# Patient Record
Sex: FEMALE | Race: WHITE
[De-identification: names, ages, dates, MRNs, and addresses within clinical notes are randomized per-mention and may not be internally consistent; named-entity substitution may affect disease eponyms.]

---

## 2018-04-20 NOTE — EDM.PDOC
ED HPI GENERAL MEDICAL PROBLEM





- General


Chief Complaint: Abdominal Pain


Stated Complaint: PAINS IN STOMACH


Time Seen by Provider: 04/20/18 07:00


Source of Information: Reports: Patient


History Limitations: Reports: No Limitations





- History of Present Illness


INITIAL COMMENTS - FREE TEXT/NARRATIVE: 





This 58 yo female patient reports to the ED with abdominal pain. The patient 

reports her symptoms started at about 0230 this morning with nausea, vomiting 

and diarrhea. The patient reports her pain has been getting worse since the 

onset. The patient reports she has been having some moderate abdominal pain 

over the past couple of weeks due to taking Metformin, but it has not been this 

bad. 


Onset: Today


Onset Date: 04/20/18


Onset Time: 02:30


Duration: Constant, Getting Worse


Location: Reports: Abdomen (upper abdomen (epigastric area))


Quality: Reports: Sharp, Stabbing


Severity: Severe


Improves with: Reports: None


Worsens with: Reports: None


Context: Reports: Other


Associated Symptoms: Reports: Nausea/Vomiting, Other (diarrhea)


  ** Middle Abdomen


Pain Score (Numeric/FACES): 8





- Related Data


 Allergies











Allergy/AdvReac Type Severity Reaction Status Date / Time


 


celecoxib [From Celebrex] Allergy  Blisters Verified 04/20/18 06:58


 


peanut Allergy  Blisters Verified 04/20/18 06:58











Home Meds: 


 Home Meds





diphenhydrAMINE HCl/Zinc Acet [Benadryl Itch Stopping Crm] 1 applic TOP Q6HR 

PRN 04/27/16 [History]


diphenhydrAMINE [Benadryl] 1 tab PO Q4HR PRN 04/27/16 [History]


traMADol [Ultram] 1 tab PO Q6HR PRN 04/27/16 [History]


metFORMIN HCl [Metformin HCl] 1,000 mg PO BID 04/28/16 [History]


DULoxetine [Cymbalta] 20 mg PO DAILY 04/20/18 [History]











Past Medical History


HEENT History: Reports: None


Cardiovascular History: Reports: None


Respiratory History: Reports: None


Gastrointestinal History: Reports: None


Genitourinary History: Reports: None


OB/GYN History: Reports: None


Musculoskeletal History: Reports: RA


Neurological History: Reports: None


Psychiatric History: Reports: None


Endocrine/Metabolic History: Reports: Diabetes, Type II, Other (See Below)


Other Endocrine/Metabolic History: borderline diabetes


Hematologic History: Reports: None


Immunologic History: Reports: None, Other (See Below)


Other Immunologic History: RA


Oncologic (Cancer) History: Reports: None


Dermatologic History: Reports: None





- Infectious Disease History


Infectious Disease History: Reports: Chicken Pox





- Past Surgical History


Head Surgeries/Procedures: Reports: None


Female  Surgical History: Reports: Hysterectomy


Musculoskeletal Surgical History: Reports: Knee Replacement





Social & Family History





- Family History


Family Medical History: Noncontributory





- Tobacco Use


Smoking Status *Q: Never Smoker


Second Hand Smoke Exposure: No





- Caffeine Use


Caffeine Use: Reports: Coffee





- Recreational Drug Use


Recreational Drug Use: No





ED ROS GENERAL





- Review of Systems


Review Of Systems: ROS reveals no pertinent complaints other than HPI.





ED EXAM, GI/ABD





- Physical Exam


Exam: See Below


Exam Limited By: No Limitations


General Appearance: Alert, WD/WN, Moderate Distress


Eyes: Bilateral: Normal Appearance, EOMI


Ears: Normal External Exam, Normal Canal, Hearing Grossly Normal, Normal TMs


Nose: Normal Inspection, Normal Mucosa, No Blood


Throat/Mouth: Normal Inspection, Normal Lips, Normal Teeth, Normal Gums, Normal 

Oropharynx, Normal Voice, No Airway Compromise


Head: Atraumatic, Normocephalic


Neck: Normal Inspection, Supple, Non-Tender, Full Range of Motion


Respiratory/Chest: No Respiratory Distress, Lungs Clear, Normal Breath Sounds, 

No Accessory Muscle Use, Chest Non-Tender


Cardiovascular: Normal Peripheral Pulses, Regular Rate, Rhythm, No Edema, No 

Gallop, No JVD, No Murmur, No Rub


GI/Abdominal Exam: Normal Bowel Sounds, No Organomegaly, No Distention, No 

Abnormal Bruit, No Mass, Tender (across entire upper abdomen)


 (Female) Exam: Deferred


Rectal (Female) Exam: Deferred


Back Exam: Normal Inspection, Full Range of Motion, NT


Extremities: Normal Inspection, Normal Range of Motion, Non-Tender, Normal 

Capillary Refill, No Pedal Edema


Neurological: Alert, Oriented, CN II-XII Intact, Normal Cognition, Normal Gait, 

Normal Reflexes, No Motor/Sensory Deficits


Psychiatric: Normal Affect, Normal Mood


Skin Exam: Warm, Dry, Intact, Normal Color, No Rash


Lymphatic: No Adenopathy





Course





- Vital Signs


Last Recorded V/S: 


 Last Vital Signs











Temp  36.8 C   04/20/18 10:17


 


Pulse  88   04/20/18 10:17


 


Resp  12   04/20/18 10:17


 


BP  130/78   04/20/18 10:17


 


Pulse Ox  93 L  04/20/18 10:17














- Orders/Labs/Meds


Orders: 


 Active Orders 24 hr











 Category Date Time Status


 


 EKG Documentation Completion [RC] URGENT Care  04/20/18 09:11 Active


 


 Chest 1V Frontal [CR] Urgent Exams  04/20/18 09:12 Taken


 


 CULTURE BLOOD [BC] Stat Lab  04/20/18 07:22 Received


 


 CULTURE BLOOD [BC] Stat Lab  04/20/18 07:52 Received


 


 INFLUENZA A+B AG SCREEN [RM] Stat Lab  04/20/18 07:20 Ordered


 


 UA W/MICROSCOPIC [URIN] Stat Lab  04/20/18 08:20 Ordered











Labs: 


 Laboratory Tests











  04/20/18 04/20/18 04/20/18 Range/Units





  07:22 07:22 07:22 


 


WBC  24.7 H    (5.0-10.0)  10^3/uL


 


RBC  4.81    (4.2-5.4)  10^6/uL


 


Hgb  13.7    (12.0-16.0)  g/dL


 


Hct  40.9    (37.0-47.0)  %


 


MCV  85.0    ()  fL


 


MCH  28.5    (27.0-34.0)  pg


 


MCHC  33.5    (33.0-35.0)  g/dL


 


Plt Count  325    (150-450)  10^3/uL


 


Neut % (Auto)  89.7 H    (42.2-75.2)  %


 


Lymph % (Auto)  3.5 L    (20.5-50.1)  %


 


Mono % (Auto)  6.1    (2-8)  %


 


Eos % (Auto)  0.6 L    (1.0-3.0)  %


 


Baso % (Auto)  0.1    (0.0-1.0)  %


 


Sodium   134 L   (135-145)  mmol/L


 


Potassium   4.1   (3.6-5.0)  mmol/L


 


Chloride   100 L   (101-111)  mmol/L


 


Carbon Dioxide   25.0   (21.0-31.0)  mmol/L


 


Anion Gap   13.1   


 


BUN   23 H   (7-18)  mg/dL


 


Creatinine   0.7   (0.6-1.3)  mg/dL


 


Est Cr Clr Drug Dosing   78.83   mL/min


 


Estimated GFR (MDRD)   > 60   


 


BUN/Creatinine Ratio   32.85   


 


Glucose   227 H   ()  mg/dL


 


Lactic Acid    2.3 H  (0.5-2.2)  mmol/L


 


Calcium   8.9   (8.4-10.2)  mg/dl


 


Total Bilirubin   1.2 H   (0.2-1.0)  mg/dL


 


AST   24   (10-42)  IU/L


 


ALT   21   (10-60)  IU/L


 


Alkaline Phosphatase   62   ()  IU/L


 


Troponin I     (0.00-0.02)  ng/ml


 


Total Protein   7.2   (6.7-8.2)  g/dl


 


Albumin   3.8   (3.2-5.5)  g/dl


 


Globulin   3.4   


 


Albumin/Globulin Ratio   1.12   


 


Amylase   50   ()  U/L


 


Lipase   11 L   (22-51)  U/L


 


Urine Color     (YELLOW)  


 


Urine Appearance     (CLEAR)  


 


Urine pH     (5.0-9.0)  


 


Ur Specific Gravity     (1.005-1.030)  


 


Urine Protein     (NEGATIVE)  


 


Urine Glucose (UA)     (NEGATIVE)  


 


Urine Ketones     (NEGATIVE)  


 


Urine Occult Blood     (NEGATIVE)  


 


Urine Nitrite     (NEGATIVE)  


 


Urine Bilirubin     (NEGATIVE)  


 


Urine Urobilinogen     (0.2-1.0)  mg/dL


 


Ur Leukocyte Esterase     (NEGATIVE)  


 


Urine RBC     /HPF


 


Urine WBC     (0-5/HPF)  /HPF


 


Ur Epithelial Cells     /HPF


 


Urine Bacteria     (0-FEW/HPF)  /HPF


 


Urine Mucus     /LPF














  04/20/18 04/20/18 Range/Units





  07:22 08:20 


 


WBC    (5.0-10.0)  10^3/uL


 


RBC    (4.2-5.4)  10^6/uL


 


Hgb    (12.0-16.0)  g/dL


 


Hct    (37.0-47.0)  %


 


MCV    ()  fL


 


MCH    (27.0-34.0)  pg


 


MCHC    (33.0-35.0)  g/dL


 


Plt Count    (150-450)  10^3/uL


 


Neut % (Auto)    (42.2-75.2)  %


 


Lymph % (Auto)    (20.5-50.1)  %


 


Mono % (Auto)    (2-8)  %


 


Eos % (Auto)    (1.0-3.0)  %


 


Baso % (Auto)    (0.0-1.0)  %


 


Sodium    (135-145)  mmol/L


 


Potassium    (3.6-5.0)  mmol/L


 


Chloride    (101-111)  mmol/L


 


Carbon Dioxide    (21.0-31.0)  mmol/L


 


Anion Gap    


 


BUN    (7-18)  mg/dL


 


Creatinine    (0.6-1.3)  mg/dL


 


Est Cr Clr Drug Dosing    mL/min


 


Estimated GFR (MDRD)    


 


BUN/Creatinine Ratio    


 


Glucose    ()  mg/dL


 


Lactic Acid    (0.5-2.2)  mmol/L


 


Calcium    (8.4-10.2)  mg/dl


 


Total Bilirubin    (0.2-1.0)  mg/dL


 


AST    (10-42)  IU/L


 


ALT    (10-60)  IU/L


 


Alkaline Phosphatase    ()  IU/L


 


Troponin I  < 0.02   (0.00-0.02)  ng/ml


 


Total Protein    (6.7-8.2)  g/dl


 


Albumin    (3.2-5.5)  g/dl


 


Globulin    


 


Albumin/Globulin Ratio    


 


Amylase    ()  U/L


 


Lipase    (22-51)  U/L


 


Urine Color   Yellow  (YELLOW)  


 


Urine Appearance   Slightly cloudy  (CLEAR)  


 


Urine pH   5.0  (5.0-9.0)  


 


Ur Specific Gravity   1.020  (1.005-1.030)  


 


Urine Protein   Trace H  (NEGATIVE)  


 


Urine Glucose (UA)   500 H  (NEGATIVE)  


 


Urine Ketones   40 H  (NEGATIVE)  


 


Urine Occult Blood   Negative  (NEGATIVE)  


 


Urine Nitrite   Negative  (NEGATIVE)  


 


Urine Bilirubin   Negative  (NEGATIVE)  


 


Urine Urobilinogen   0.2  (0.2-1.0)  mg/dL


 


Ur Leukocyte Esterase   Negative  (NEGATIVE)  


 


Urine RBC   0-5  /HPF


 


Urine WBC   0-5  (0-5/HPF)  /HPF


 


Ur Epithelial Cells   Rare  /HPF


 


Urine Bacteria   Rare  (0-FEW/HPF)  /HPF


 


Urine Mucus   Rare  /LPF











Meds: 


Medications














Discontinued Medications














Generic Name Dose Route Start Last Admin





  Trade Name Freq  PRN Reason Stop Dose Admin


 


Calcium Carbonate/Glycine  1,000 mg  04/20/18 10:40  





  Tums  PO  04/20/18 10:41  





  ONETIME ONE   





     





     





     





     


 


Hydromorphone HCl  0.5 mg  04/20/18 07:35  04/20/18 07:41





  Dilaudid  IVPUSH  04/20/18 07:36  0.5 mg





  ONETIME ONE   Administration





     





     





     





     


 


Hydromorphone HCl  0.5 mg  04/20/18 07:58  04/20/18 08:07





  Dilaudid  IVPUSH  04/20/18 07:59  0.5 mg





  ONETIME ONE   Administration





     





     





     





     


 


Hydromorphone HCl  0.5 mg  04/20/18 10:40  





  Dilaudid  IVPUSH  04/20/18 10:41  





  ONETIME ONE   





     





     





     





     


 


Sodium Chloride  1,000 mls @ 999 mls/hr  04/20/18 07:11  04/20/18 07:18





  Normal Saline  IV  04/20/18 08:11  999 mls/hr





  .BOLUS ONE   Administration





     





     





     





     


 


Iopamidol  75 ml  04/20/18 07:56  04/20/18 08:33





  Isovue-300 (61%)  IVPUSH  04/20/18 07:57  75 ml





  ONETIME ONE   Administration





     





     





     





     


 


Ondansetron HCl  4 mg  04/20/18 07:11  04/20/18 07:21





  Zofran  IV  04/20/18 07:12  4 mg





  ONETIME ONE   Administration





     





     





     





     


 


Pantoprazole Sodium  40 mg  04/20/18 10:40  





  Protonix Iv***  IVPUSH  04/20/18 10:41  





  ONETIME ONE   





     





     





     





     














Departure





- Departure


Time of Disposition: 10:55


Disposition: Admitted As Inpatient 65


Condition: Fair


Clinical Impression: 


Gastritis


Qualifiers:


 Gastritis type: unspecified gastritis Chronicity: acute Gastritis bleeding: 

presence of bleeding unspecified Qualified Code(s): K29.00 - Acute gastritis 

without bleeding








- Discharge Information


Care Plan Goals: 


Discussed the examination, lab, EKG, CT and x-ray results with Dr. Cai. Dr. Cai accepted the patient for continued evaluation and management as an 

inpatient at [facility] in [place]. 





- My Orders


Last 24 Hours: 


My Active Orders





04/20/18 07:20


INFLUENZA A+B AG SCREEN [RM] Stat 





04/20/18 07:22


CULTURE BLOOD [BC] Stat 





04/20/18 07:52


CULTURE BLOOD [BC] Stat 





04/20/18 08:20


UA W/MICROSCOPIC [URIN] Stat 





04/20/18 09:11


EKG Documentation Completion [RC] URGENT 





04/20/18 09:12


Chest 1V Frontal [CR] Urgent 














- Assessment/Plan


Last 24 Hours: 


My Active Orders





04/20/18 07:20


INFLUENZA A+B AG SCREEN [RM] Stat 





04/20/18 07:22


CULTURE BLOOD [BC] Stat 





04/20/18 07:52


CULTURE BLOOD [BC] Stat 





04/20/18 08:20


UA W/MICROSCOPIC [URIN] Stat 





04/20/18 09:11


EKG Documentation Completion [RC] URGENT 





04/20/18 09:12


Chest 1V Frontal [CR] Urgent

## 2018-04-20 NOTE — PCM.HP
H&P History of Present Illness





- General


Date of Service: 04/20/18


Admit Problem/Dx: 


 Admission Diagnosis/Problem





Admission Diagnosis/Problem      Abdominal pain








Source of Information: Patient





- History of Present Illness


Initial Comments - Free Text/Narative: 





The patient is a 57-year-old who has a history of possible rheumatoid arthritis

, on no medications for that. No steroids. No immunomodulators.


The patient has a history of hypertension on no medications. Has a history of 

diabetes. She has been on metformin for a few weeks but she felt that it has 

been causing nausea.


She was prescribed glipizide but not taking it.


She says her BSs are usually below 130





She has her grandchild visiting who had diarrhea.


She had had epigastric burning for a few weeks, she felt that related to 

metformin.


She developed nausea, vomiting, diarrhea. Early am developed sever epigastric, 

mid abdomen pain.


No associated fever.,


In the ER noted to have leukocytosis.


She was given dilaudid that improved the pain but only for short period.


Now she is feeling well. Currently no pain and no nausea.





  ** Middle Abdomen


Pain Score (Numeric/FACES): 8





- Related Data


Allergies/Adverse Reactions: 


 Allergies











Allergy/AdvReac Type Severity Reaction Status Date / Time


 


celecoxib [From Celebrex] Allergy Severe Blisters Verified 04/20/18 11:22


 


peanut Allergy Severe Blisters Verified 04/20/18 11:22











Home Medications: 


 Home Meds





diphenhydrAMINE HCl/Zinc Acet [Benadryl Itch Stopping Crm] 1 applic TOP Q6HR 

PRN 04/27/16 [History]


diphenhydrAMINE [Benadryl] 50 mg PO Q4HR PRN 04/27/16 [History]


traMADol [Ultram] 50 mg PO Q6HR PRN 04/27/16 [History]


metFORMIN HCl [Metformin HCl] 1,000 mg PO BID 04/28/16 [History]


Cyanocobalamin (Vitamin B-12) [B-12] 1,000 mcg PO DAILY 04/20/18 [History]


DULoxetine [Cymbalta] 20 mg PO DAILY 04/20/18 [History]


Multivitamin/Iron/Folic Acid [Centrum Adults Tablet] 1 tab PO DAILY 04/20/18 [

History]


Omega-3/DHA/Epa/Fish Oil [Fish Oil 1,000 mg Softgel] 1,000 mg PO DAILY 04/20/18 

[History]


Tumeric 1 tab PO DAILY 04/20/18 [History]











Past Medical History


HEENT History: Reports: None


Cardiovascular History: Reports: None


Respiratory History: Reports: None


Gastrointestinal History: Reports: None


Genitourinary History: Reports: None


OB/GYN History: Reports: None


Musculoskeletal History: Reports: RA


Neurological History: Reports: None


Psychiatric History: Reports: None


Endocrine/Metabolic History: Reports: Diabetes, Type II, Other (See Below)


Other Endocrine/Metabolic History: borderline diabetes


Hematologic History: Reports: None


Immunologic History: Reports: None, Other (See Below)


Other Immunologic History: RA


Oncologic (Cancer) History: Reports: None


Dermatologic History: Reports: None





- Infectious Disease History


Infectious Disease History: Reports: Chicken Pox





- Past Surgical History


Head Surgeries/Procedures: Reports: None


Female  Surgical History: Reports: Hysterectomy


Musculoskeletal Surgical History: Reports: Knee Replacement





Social & Family History





- Family History


Family Medical History: Noncontributory





- Tobacco Use


Smoking Status *Q: Never Smoker


Second Hand Smoke Exposure: No





- Caffeine Use


Caffeine Use: Reports: Coffee





- Recreational Drug Use


Recreational Drug Use: No





H&P Review of Systems





- Review of Systems:


Review Of Systems: See Below


General: Reports: Malaise.  Denies: Fever


HEENT: Denies: Sore Throat, Vertigo


Pulmonary: Denies: Shortness of Breath


Cardiovascular: Reports: Edema (she feels her fingers are more puffy).  Denies: 

Chest Pain


Gastrointestinal: Reports: Abdominal Pain (earlier), Diarrhea, Nausea


Genitourinary: Denies: Dysuria


Musculoskeletal: Reports: Joint Pain (chronic).  Denies: Neck Pain


Skin: Denies: Rash


Psychiatric: Denies: Confusion


Neurological: Denies: Dizziness





Exam





- Exam


Exam: See Below





- Vital Signs


Vital Signs: 


 Last Vital Signs











Temp  36.5 C   04/20/18 11:27


 


Pulse  99   04/20/18 11:27


 


Resp  20   04/20/18 11:27


 


BP  131/81   04/20/18 11:27


 


Pulse Ox  97   04/20/18 11:27











Weight: 93.621 kg





- Exam


Quality Assessment: No: Supplemental Oxygen


General: Alert, Oriented


HEENT: Other


Lungs: Clear to Auscultation, Normal Respiratory Effort


Cardiovascular: Regular Rate, Regular Rhythm


GI/Abdominal Exam: Normal Bowel Sounds, Soft, No Distention, Tender (mild 

epigastric ), Other (obese).  No: Guarding, Rigid, Rebound


Extremities: No Pedal Edema.  No: Joint Swelling, Increased Warmth


Skin: Warm, Dry


Neuro Extensive - Mental Status: Alert, Oriented x3, Normal Mood/Affect


Psychiatric: Alert, Normal Affect, Normal Mood





- Patient Data


Lab Results Last 24 hrs: 


 Laboratory Results - last 24 hr











  04/20/18 04/20/18 04/20/18 Range/Units





  07:22 07:22 07:22 


 


WBC  24.7 H    (5.0-10.0)  10^3/uL


 


RBC  4.81    (4.2-5.4)  10^6/uL


 


Hgb  13.7    (12.0-16.0)  g/dL


 


Hct  40.9    (37.0-47.0)  %


 


MCV  85.0    ()  fL


 


MCH  28.5    (27.0-34.0)  pg


 


MCHC  33.5    (33.0-35.0)  g/dL


 


Plt Count  325    (150-450)  10^3/uL


 


Neut % (Auto)  89.7 H    (42.2-75.2)  %


 


Lymph % (Auto)  3.5 L    (20.5-50.1)  %


 


Mono % (Auto)  6.1    (2-8)  %


 


Eos % (Auto)  0.6 L    (1.0-3.0)  %


 


Baso % (Auto)  0.1    (0.0-1.0)  %


 


Sodium   134 L   (135-145)  mmol/L


 


Potassium   4.1   (3.6-5.0)  mmol/L


 


Chloride   100 L   (101-111)  mmol/L


 


Carbon Dioxide   25.0   (21.0-31.0)  mmol/L


 


Anion Gap   13.1   


 


BUN   23 H   (7-18)  mg/dL


 


Creatinine   0.7   (0.6-1.3)  mg/dL


 


Est Cr Clr Drug Dosing   78.83   mL/min


 


Estimated GFR (MDRD)   > 60   


 


BUN/Creatinine Ratio   32.85   


 


Glucose   227 H   ()  mg/dL


 


POC Glucose     ()  mg/dl


 


Lactic Acid    2.3 H  (0.5-2.2)  mmol/L


 


Calcium   8.9   (8.4-10.2)  mg/dl


 


Total Bilirubin   1.2 H   (0.2-1.0)  mg/dL


 


AST   24   (10-42)  IU/L


 


ALT   21   (10-60)  IU/L


 


Alkaline Phosphatase   62   ()  IU/L


 


Troponin I     (0.00-0.02)  ng/ml


 


Total Protein   7.2   (6.7-8.2)  g/dl


 


Albumin   3.8   (3.2-5.5)  g/dl


 


Globulin   3.4   


 


Albumin/Globulin Ratio   1.12   


 


Amylase   50   ()  U/L


 


Lipase   11 L   (22-51)  U/L


 


Urine Color     (YELLOW)  


 


Urine Appearance     (CLEAR)  


 


Urine pH     (5.0-9.0)  


 


Ur Specific Gravity     (1.005-1.030)  


 


Urine Protein     (NEGATIVE)  


 


Urine Glucose (UA)     (NEGATIVE)  


 


Urine Ketones     (NEGATIVE)  


 


Urine Occult Blood     (NEGATIVE)  


 


Urine Nitrite     (NEGATIVE)  


 


Urine Bilirubin     (NEGATIVE)  


 


Urine Urobilinogen     (0.2-1.0)  mg/dL


 


Ur Leukocyte Esterase     (NEGATIVE)  


 


Urine RBC     /HPF


 


Urine WBC     (0-5/HPF)  /HPF


 


Ur Epithelial Cells     /HPF


 


Urine Bacteria     (0-FEW/HPF)  /HPF


 


Urine Mucus     /LPF














  04/20/18 04/20/18 04/20/18 Range/Units





  07:22 08:20 11:50 


 


WBC     (5.0-10.0)  10^3/uL


 


RBC     (4.2-5.4)  10^6/uL


 


Hgb     (12.0-16.0)  g/dL


 


Hct     (37.0-47.0)  %


 


MCV     ()  fL


 


MCH     (27.0-34.0)  pg


 


MCHC     (33.0-35.0)  g/dL


 


Plt Count     (150-450)  10^3/uL


 


Neut % (Auto)     (42.2-75.2)  %


 


Lymph % (Auto)     (20.5-50.1)  %


 


Mono % (Auto)     (2-8)  %


 


Eos % (Auto)     (1.0-3.0)  %


 


Baso % (Auto)     (0.0-1.0)  %


 


Sodium     (135-145)  mmol/L


 


Potassium     (3.6-5.0)  mmol/L


 


Chloride     (101-111)  mmol/L


 


Carbon Dioxide     (21.0-31.0)  mmol/L


 


Anion Gap     


 


BUN     (7-18)  mg/dL


 


Creatinine     (0.6-1.3)  mg/dL


 


Est Cr Clr Drug Dosing     mL/min


 


Estimated GFR (MDRD)     


 


BUN/Creatinine Ratio     


 


Glucose     ()  mg/dL


 


POC Glucose    159 H  ()  mg/dl


 


Lactic Acid     (0.5-2.2)  mmol/L


 


Calcium     (8.4-10.2)  mg/dl


 


Total Bilirubin     (0.2-1.0)  mg/dL


 


AST     (10-42)  IU/L


 


ALT     (10-60)  IU/L


 


Alkaline Phosphatase     ()  IU/L


 


Troponin I  < 0.02    (0.00-0.02)  ng/ml


 


Total Protein     (6.7-8.2)  g/dl


 


Albumin     (3.2-5.5)  g/dl


 


Globulin     


 


Albumin/Globulin Ratio     


 


Amylase     ()  U/L


 


Lipase     (22-51)  U/L


 


Urine Color   Yellow   (YELLOW)  


 


Urine Appearance   Slightly cloudy   (CLEAR)  


 


Urine pH   5.0   (5.0-9.0)  


 


Ur Specific Gravity   1.020   (1.005-1.030)  


 


Urine Protein   Trace H   (NEGATIVE)  


 


Urine Glucose (UA)   500 H   (NEGATIVE)  


 


Urine Ketones   40 H   (NEGATIVE)  


 


Urine Occult Blood   Negative   (NEGATIVE)  


 


Urine Nitrite   Negative   (NEGATIVE)  


 


Urine Bilirubin   Negative   (NEGATIVE)  


 


Urine Urobilinogen   0.2   (0.2-1.0)  mg/dL


 


Ur Leukocyte Esterase   Negative   (NEGATIVE)  


 


Urine RBC   0-5   /HPF


 


Urine WBC   0-5   (0-5/HPF)  /HPF


 


Ur Epithelial Cells   Rare   /HPF


 


Urine Bacteria   Rare   (0-FEW/HPF)  /HPF


 


Urine Mucus   Rare   /LPF











Result Diagrams: 


 04/20/18 07:22





 04/20/18 07:22


Angel Results Last 24 hrs: 


 Microbiology











 04/20/18 07:20 Influenza Type A Antigen Screen - Final





 Nasal, Unspecified    NEGATIVE INFLUENZA A VIRUS AG





 Influenza Type B Antigen Screen - Final





    NEGATIVE INFLUENZA B VIRUS AG











Problem List Initiated/Reviewed/Updated: Yes


Orders Last 24hrs: 


 Active Orders 24 hr











 Category Date Time Status


 


 Patient Status [ADT] Routine ADT  04/20/18 11:27 Active


 


 Glucose [Blood Glucose Check, Bedside] [RC] QIDACANDBED Care  04/20/18 11:33 

Active


 


 Oxygen Therapy [RC] PRN Care  04/20/18 11:27 Active


 


 Peripheral IV Care [RC] .AS DIRECTED Care  04/20/18 11:29 Active


 


 Up With Assistance [RC] ASDIRECTED Care  04/20/18 11:27 Active


 


 VTE/DVT Education [RC] PER UNIT ROUTINE Care  04/20/18 11:27 Active


 


 Vital Signs [RC] Q4H Care  04/20/18 11:27 Active


 


 Clear Liquid Diet [DIET] Diet  04/20/18 Lunch Active


 


 Chest 1V Frontal [CR] Urgent Exams  04/20/18 09:12 Taken


 


 BASIC METABOLIC PANEL,BMP [CHEM] AM Lab  04/21/18 05:15 Ordered


 


 CBC WITH AUTO DIFF [HEME] AM Lab  04/21/18 05:15 Ordered


 


 CULTURE BLOOD [BC] Stat Lab  04/20/18 07:22 Received


 


 CULTURE BLOOD [BC] Stat Lab  04/20/18 07:52 Received


 


 INFLUENZA A+B AG SCREEN [RM] Stat Lab  04/20/18 07:20 Ordered


 


 LACTIC ACID [CHEM] AM Lab  04/21/18 05:11 Ordered


 


 LACTIC ACID [CHEM] Timed Lab  04/20/18 15:00 Ordered


 


 UA W/MICROSCOPIC [URIN] Stat Lab  04/20/18 08:20 Ordered


 


 Acetaminophen [Tylenol] Med  04/20/18 11:27 Active





 650 mg PO Q4H PRN   


 


 Famotidine [Pepcid] Med  04/20/18 11:30 Active





 20 mg PO BID   


 


 Heparin Sodium Med  04/20/18 14:00 Active





 5,000 units SUBCUT Q8HR   


 


 Insulin Aspart [NovoLOG] Med  04/20/18 17:00 Active





 See Protocol  SUBCUT TIDAC   


 


 Morphine Med  04/20/18 11:27 Active





 1 mg IVPUSH Q2H PRN   


 


 NS + KCl 20mEq/L [Normal Saline with 20 mEq KCl] 1,000 Med  04/20/18 12:00 

Active





 ml   





 IV ASDIRECTED   


 


 Ondansetron [Zofran ODT] Med  04/20/18 11:27 Active





 4 mg PO Q6H PRN   


 


 Ondansetron [Zofran] Med  04/20/18 11:27 Active





 4 mg IVPUSH Q4H PRN   


 


 Pantoprazole [ProTONIX IV***] Med  04/20/18 17:00 Active





 40 mg IVPUSH BIDAC   


 


 Sodium Chloride 0.9% [Saline Flush] Med  04/20/18 11:27 Active





 10 ml FLUSH ASDIRECTED PRN   


 


 Sucralfate [Carafate] Med  04/20/18 17:00 Active





 1 gm PO TIDAC   


 


 Zolpidem [Ambien] Med  04/20/18 11:27 Active





 5 mg PO BEDTIME PRN   


 


 diphenhydrAMINE [Benadryl] Med  04/20/18 12:01 Ordered





 50 mg PO Q4HR PRN   


 


 oxyCODONE Med  04/20/18 11:27 Active





 5 mg PO Q4H PRN   


 


 Antiembolic Hose [OM.PC] Per Unit Routine Oth  04/20/18 11:28 Ordered


 


 Peripheral IV Insertion Adult [OM.PC] Routine Oth  04/20/18 11:27 Ordered


 


 Resuscitation Status Routine Resus Stat  04/20/18 11:27 Ordered








 Medication Orders





Acetaminophen (Tylenol)  650 mg PO Q4H PRN


   PRN Reason: Pain (Mild 1-3)/fever


Diphenhydramine HCl (Benadryl)  50 mg PO Q4HR PRN


   PRN Reason: Itching


Famotidine (Pepcid)  20 mg PO BID [facility]


Heparin Sodium (Porcine) (Heparin Sodium)  5,000 units SUBCUT Q8HR [facility]


Potassium Chloride/Sodium Chloride (Normal Saline With 20 Meq Kcl)  1,000 mls @ 

100 mls/hr IV ASDIRECTED [facility]


Insulin Aspart (Novolog)  0 unit SUBCUT TIDAC [facility]; Protocol


Morphine Sulfate (Morphine)  1 mg IVPUSH Q2H PRN


   PRN Reason: Pain (severe 7-10)


Ondansetron HCl (Zofran Odt)  4 mg PO Q6H PRN


   PRN Reason: nausea, able to take PO


Ondansetron HCl (Zofran)  4 mg IVPUSH Q4H PRN


   PRN Reason: Nausea/Vomiting


Oxycodone HCl (Oxycodone)  5 mg PO Q4H PRN


   PRN Reason: Pain (moderate 4-6)


Pantoprazole Sodium (Protonix Iv***)  40 mg IVPUSH BIDAC [facility]


Sodium Chloride (Saline Flush)  10 ml FLUSH ASDIRECTED PRN


   PRN Reason: Keep Vein Open


Sucralfate (Carafate)  1 gm PO TIDAC [facility]


Zolpidem Tartrate (Ambien)  5 mg PO BEDTIME PRN


   PRN Reason: Sleep








Assessment/Plan Comment:: 





56 yo with h/o possible RA, DM on metformin, chronic pain taking  tramadol


exposure with child with n/v/d





Presented with n/v/d epigastric pain.


CT abdomen showed no perforation, no acute finding, normal vs. mild SBO


lactic acid minimally elevated


leukocytosis noted











1. Abd pain 


likley gastritis, possible viral gastroenteritis





will keep on clear liquid


treat with sucralfate, pepcid, protonix





follow clinically


repeat lactic acid





2. Leukocytosis


might relate to viral gastriis, nausea


doubt bacterial infection





hold Abx for now


recheck in AM





3. DM


hold metformin now wit IV Dye exposure


she is not taking glipizide - only as needed when BS is high





will follow BS


use sq supplemental insulin as needed





4. DVT prophylaxis with sq heparin

## 2018-04-21 NOTE — PCM.DCSUM1
**Discharge Summary





- Hospital Course


Free Text/Narrative:: 








58 yo with h/o possible RA, DM on metformin, chronic pain taking  tramadol


exposure with child with n/v/d





Presented with n/v/d epigastric pain.


CT abdomen showed no perforation, no acute finding, normal vs. mild small bowel 

dilatation


lactic acid minimally elevated, leukocytosis noted on admission 





Overnight continue to have a abdominal pain not improving with oxycodone, 

morphine. There was also pain in her muscles and complaining of headache.


Continue to have diarrhea, dark initially. Occult blood tested positive.


No more vomiting, no fever











1. Abd pain 


likely gastritis, possible viral gastroenteritis





kept on clear liquid


treated with sucralfate, pepcid, protonix





Lactic acidosis resolved, leukocytosis resolved


Pain continued. She is very worried and would like to see a specialist.


We will transfer the patient to [facility] where GI specialist is available.





2. Leukocytosis


Likely  related to viral gastriis, nausea


doubt bacterial infection





hold Abx for now


Resolved





3. DM


hold metformin now wit IV Dye exposure


she is not taking glipizide - only as needed when BS is highAt home





will follow BS


use sq supplemental insulin as needed





4. Possible low-grade GI bleed with occult blood positive stools


Monitor hemoglobin, continue proton pump inhibitor








- Discharge Data


Discharge Date: 04/21/18


Discharge Disposition: DC/Tfer to Acute Hospital 02


Condition: Good





- Patient Instructions


Diet: Clear Liquid Diet


Activity: As Tolerated





- Discharge Plan


Home Medications: 


 Home Meds





diphenhydrAMINE HCl/Zinc Acet [Benadryl Itch Stopping Crm] 1 applic TOP Q6HR 

PRN 04/27/16 [History]


diphenhydrAMINE [Benadryl] 50 mg PO Q4HR PRN 04/27/16 [History]


traMADol [Ultram] 50 mg PO Q6HR PRN 04/27/16 [History]


metFORMIN HCl [Metformin HCl] 1,000 mg PO BID 04/28/16 [History]


Cyanocobalamin (Vitamin B-12) [B-12] 1,000 mcg PO DAILY 04/20/18 [History]


DULoxetine [Cymbalta] 20 mg PO DAILY 04/20/18 [History]


Multivitamin/Iron/Folic Acid [Centrum Adults Tablet] 1 tab PO DAILY 04/20/18 [

History]


Omega-3/DHA/Epa/Fish Oil [Fish Oil 1,000 mg Softgel] 1,000 mg PO DAILY 04/20/18 

[History]


Tumeric 1 tab PO DAILY 04/20/18 [History]


Dicyclomine [Bentyl] 20 mg PO Q6H PRN  cap 04/21/18 [Rx]


Famotidine [Pepcid] 20 mg PO BID  tablet 04/21/18 [Rx]


HYDROmorphone [Dilaudid] 1 mg IVPUSH Q4H PRN  syringe 04/21/18 [Rx]


Heparin Sodium 5,000 units SUBCUT Q8HR  vial 04/21/18 [Rx]


Insulin Aspart [NovoLOG] 0 unit SUBCUT TIDAC  pen 04/21/18 [Rx]


Ondansetron [Zofran ODT] 4 mg PO Q6H PRN  tab.dis 04/21/18 [Rx]


Pantoprazole [ProTONIX IV***] 40 mg IVPUSH BIDAC  vial 04/21/18 [Rx]


Sucralfate [Carafate] 1 gm PO TIDAC  cup 04/21/18 [Rx]


Zolpidem [Ambien] 5 mg PO BEDTIME PRN  tablet 04/21/18 [Rx]


oxyCODONE 5 mg PO Q4H PRN  tablet 04/21/18 [Rx]











- Discharge Summary/Plan Comment


DC Time >30 min.: Yes (Arranging transfer and accepting physician for transfer 

to [facility])





- General Info


Date of Service: 04/21/18


Functional Status: Denies: Pain Controlled





- Review of Systems


General: Denies: Fever, Weakness


Pulmonary: Denies: Shortness of Breath


Cardiovascular: Denies: Chest Pain


Gastrointestinal: Reports: Abdominal Pain


Musculoskeletal: Reports: Other (Pain all over)


Neurological: Denies: Confusion


Psychiatric: Denies: Depression





- Patient Data


Vitals - Most Recent: 


 Last Vital Signs











Temp  36.8 C   04/21/18 07:00


 


Pulse  74   04/21/18 07:00


 


Resp  18   04/21/18 07:00


 


BP  130/64   04/21/18 07:00


 


Pulse Ox  95   04/21/18 07:00











Weight - Most Recent: 93.621 kg


I&O - Last 24 hours: 


 Intake & Output











 04/20/18 04/21/18 04/21/18





 22:59 06:59 14:59


 


Intake Total  700 300


 


Output Total  1500 


 


Balance  -800 300











Lab Results - Last 24 hrs: 


 Laboratory Results - last 24 hr











  04/20/18 04/20/18 04/20/18 Range/Units





  11:50 15:10 16:38 


 


WBC     (5.0-10.0)  10^3/uL


 


RBC     (4.2-5.4)  10^6/uL


 


Hgb     (12.0-16.0)  g/dL


 


Hct     (37.0-47.0)  %


 


MCV     ()  fL


 


MCH     (27.0-34.0)  pg


 


MCHC     (33.0-35.0)  g/dL


 


Plt Count     (150-450)  10^3/uL


 


Neut % (Auto)     (42.2-75.2)  %


 


Lymph % (Auto)     (20.5-50.1)  %


 


Mono % (Auto)     (2-8)  %


 


Eos % (Auto)     (1.0-3.0)  %


 


Baso % (Auto)     (0.0-1.0)  %


 


Sodium     (135-145)  mmol/L


 


Potassium     (3.6-5.0)  mmol/L


 


Chloride     (101-111)  mmol/L


 


Carbon Dioxide     (21.0-31.0)  mmol/L


 


Anion Gap     


 


BUN     (7-18)  mg/dL


 


Creatinine     (0.6-1.3)  mg/dL


 


Est Cr Clr Drug Dosing     mL/min


 


Estimated GFR (MDRD)     


 


Glucose     ()  mg/dL


 


POC Glucose  159 H   118 H  ()  mg/dl


 


Lactic Acid   0.8   (0.5-2.2)  mmol/L


 


Calcium     (8.4-10.2)  mg/dl














  04/20/18 04/21/18 04/21/18 Range/Units





  21:04 06:30 06:30 


 


WBC   5.6   (5.0-10.0)  10^3/uL


 


RBC   4.25   (4.2-5.4)  10^6/uL


 


Hgb   12.2  D   (12.0-16.0)  g/dL


 


Hct   37.2   (37.0-47.0)  %


 


MCV   87.5   ()  fL


 


MCH   28.7   (27.0-34.0)  pg


 


MCHC   32.8 L   (33.0-35.0)  g/dL


 


Plt Count   261   (150-450)  10^3/uL


 


Neut % (Auto)   63.6   (42.2-75.2)  %


 


Lymph % (Auto)   23.7   (20.5-50.1)  %


 


Mono % (Auto)   9.4 H   (2-8)  %


 


Eos % (Auto)   3.1 H   (1.0-3.0)  %


 


Baso % (Auto)   0.2   (0.0-1.0)  %


 


Sodium    136  (135-145)  mmol/L


 


Potassium    3.9  (3.6-5.0)  mmol/L


 


Chloride    106  (101-111)  mmol/L


 


Carbon Dioxide    26.0  (21.0-31.0)  mmol/L


 


Anion Gap    7.9  


 


BUN    10  (7-18)  mg/dL


 


Creatinine    0.6  (0.6-1.3)  mg/dL


 


Est Cr Clr Drug Dosing    103.10  mL/min


 


Estimated GFR (MDRD)    > 60  


 


Glucose    152 H  ()  mg/dL


 


POC Glucose  123 H    ()  mg/dl


 


Lactic Acid     (0.5-2.2)  mmol/L


 


Calcium    8.1 L  (8.4-10.2)  mg/dl














  04/21/18 04/21/18 Range/Units





  06:30 07:55 


 


WBC    (5.0-10.0)  10^3/uL


 


RBC    (4.2-5.4)  10^6/uL


 


Hgb    (12.0-16.0)  g/dL


 


Hct    (37.0-47.0)  %


 


MCV    ()  fL


 


MCH    (27.0-34.0)  pg


 


MCHC    (33.0-35.0)  g/dL


 


Plt Count    (150-450)  10^3/uL


 


Neut % (Auto)    (42.2-75.2)  %


 


Lymph % (Auto)    (20.5-50.1)  %


 


Mono % (Auto)    (2-8)  %


 


Eos % (Auto)    (1.0-3.0)  %


 


Baso % (Auto)    (0.0-1.0)  %


 


Sodium    (135-145)  mmol/L


 


Potassium    (3.6-5.0)  mmol/L


 


Chloride    (101-111)  mmol/L


 


Carbon Dioxide    (21.0-31.0)  mmol/L


 


Anion Gap    


 


BUN    (7-18)  mg/dL


 


Creatinine    (0.6-1.3)  mg/dL


 


Est Cr Clr Drug Dosing    mL/min


 


Estimated GFR (MDRD)    


 


Glucose    ()  mg/dL


 


POC Glucose   139 H  ()  mg/dl


 


Lactic Acid  0.8   (0.5-2.2)  mmol/L


 


Calcium    (8.4-10.2)  mg/dl











JONNA Results - Last 24 hrs: 


 Microbiology











 04/20/18 18:45 Stool Occult Blood (JONNA) - Final





 Stool / Feces 


 


 04/20/18 07:20 Influenza Type A Antigen Screen - Final





 Nasal, Unspecified    NEGATIVE INFLUENZA A VIRUS AG





 Influenza Type B Antigen Screen - Final





    NEGATIVE INFLUENZA B VIRUS AG











Med Orders - Current: 


 Current Medications





Acetaminophen (Tylenol)  650 mg PO Q4H PRN


   PRN Reason: Pain (Mild 1-3)/fever


   Last Admin: 04/21/18 02:44 Dose:  650 mg


Dicyclomine HCl (Bentyl)  20 mg PO Q6H PRN


   PRN Reason: abd pain


   Last Admin: 04/21/18 04:59 Dose:  20 mg


Diphenhydramine HCl (Benadryl)  50 mg PO Q4HR PRN


   PRN Reason: Itching


Famotidine (Pepcid)  20 mg PO BID [facility]


   Last Admin: 04/21/18 09:06 Dose:  20 mg


Heparin Sodium (Porcine) (Heparin Sodium)  5,000 units SUBCUT Q8HR [facility]


   Last Admin: 04/21/18 06:01 Dose:  5,000 units


Hydromorphone HCl (Dilaudid)  1 mg IVPUSH Q4H PRN


   PRN Reason: Pain


   Last Admin: 04/21/18 09:59 Dose:  1 mg


Potassium Chloride/Sodium Chloride (Normal Saline With 20 Meq Kcl)  1,000 mls @ 

100 mls/hr IV ASDIRECTED [facility]


   Last Admin: 04/21/18 09:05 Dose:  100 mls/hr


Insulin Aspart (Novolog)  0 unit SUBCUT TIDAC [facility]; Protocol


   Last Admin: 04/21/18 09:06 Dose:  Not Given


Ondansetron HCl (Zofran Odt)  4 mg PO Q6H PRN


   PRN Reason: nausea, able to take PO


Ondansetron HCl (Zofran)  4 mg IVPUSH Q4H PRN


   PRN Reason: Nausea/Vomiting


   Last Admin: 04/20/18 16:58 Dose:  4 mg


Oxycodone HCl (Oxycodone)  5 mg PO Q4H PRN


   PRN Reason: Pain (moderate 4-6)


   Last Admin: 04/21/18 09:05 Dose:  5 mg


Pantoprazole Sodium (Protonix Iv***)  40 mg IVPUSH BIDAC TERI


   Last Admin: 04/21/18 06:00 Dose:  40 mg


Sodium Chloride (Saline Flush)  10 ml FLUSH ASDIRECTED PRN


   PRN Reason: Keep Vein Open


   Last Admin: 04/20/18 12:44 Dose:  10 ml


Sucralfate (Carafate)  1 gm PO TIDAC [facility]


   Last Admin: 04/21/18 09:06 Dose:  1 gm


Zolpidem Tartrate (Ambien)  5 mg PO BEDTIME PRN


   PRN Reason: Sleep


   Last Admin: 04/20/18 22:04 Dose:  5 mg





Discontinued Medications





Calcium Carbonate/Glycine (Tums)  1,000 mg PO ONETIME ONE


   Stop: 04/20/18 10:41


   Last Admin: 04/20/18 11:30 Dose:  1,000 mg


Hydromorphone HCl (Dilaudid)  0.5 mg IVPUSH ONETIME ONE


   Stop: 04/20/18 07:36


   Last Admin: 04/20/18 07:41 Dose:  0.5 mg


Hydromorphone HCl (Dilaudid)  0.5 mg IVPUSH ONETIME ONE


   Stop: 04/20/18 07:59


   Last Admin: 04/20/18 08:07 Dose:  0.5 mg


Hydromorphone HCl (Dilaudid)  0.5 mg IVPUSH ONETIME ONE


   Stop: 04/20/18 10:41


   Last Admin: 04/20/18 10:52 Dose:  0.5 mg


Sodium Chloride (Normal Saline)  1,000 mls @ 999 mls/hr IV .BOLUS ONE


   Stop: 04/20/18 08:11


   Last Admin: 04/20/18 07:18 Dose:  999 mls/hr


Iopamidol (Isovue-300 (61%))  75 ml IVPUSH ONETIME ONE


   Stop: 04/20/18 07:57


   Last Admin: 04/20/18 08:33 Dose:  75 ml


Ketorolac Tromethamine (Toradol)  15 mg IVPUSH ONETIME ONE


   Stop: 04/20/18 22:53


   Last Admin: 04/20/18 23:06 Dose:  15 mg


Morphine Sulfate (Morphine)  1 mg IVPUSH Q2H PRN


   PRN Reason: Pain (severe 7-10)


   Last Admin: 04/20/18 16:19 Dose:  1 mg


Morphine Sulfate (Morphine)  2 mg IVPUSH Q2H PRN


   PRN Reason: Pain (severe 7-10)


   Last Admin: 04/20/18 22:42 Dose:  2 mg


Ondansetron HCl (Zofran)  4 mg IV ONETIME ONE


   Stop: 04/20/18 07:12


   Last Admin: 04/20/18 07:21 Dose:  4 mg


Pantoprazole Sodium (Protonix Iv***)  40 mg IVPUSH ONETIME ONE


   Stop: 04/20/18 10:41


   Last Admin: 04/20/18 10:52 Dose:  40 mg











- Exam


General: Reports: Alert, Oriented


Neck: Reports: Supple


Lungs: Reports: Clear to Auscultation, Normal Respiratory Effort


Cardiovascular: Reports: Regular Rate, Regular Rhythm


GI/Abdominal Exam: Normal Bowel Sounds, Soft, No Distention, Tender (Mild 

diffuse).  No: Guarding, Rigid, Rebound


Extremities: No Pedal Edema

## 2018-04-24 NOTE — EKG
04/20/2018- ISAURO MONROY -

 

FINDINGS:  EKG, per my reading, shows sinus rhythm with right bundle-branch

block at the rate of 90.

 

DD:  04/24/2018 10:46:18

DT:  04/24/2018 12:36:28

[facility]

Job #:  [id number]

## 2020-11-12 ENCOUNTER — HOSPITAL ENCOUNTER (EMERGENCY)
Dept: HOSPITAL 43 - DL.ED | Age: 60
Discharge: HOME | End: 2020-11-12
Payer: MEDICARE

## 2020-11-12 VITALS — SYSTOLIC BLOOD PRESSURE: 143 MMHG | HEART RATE: 106 BPM | DIASTOLIC BLOOD PRESSURE: 81 MMHG

## 2020-11-12 DIAGNOSIS — E11.65: ICD-10-CM

## 2020-11-12 DIAGNOSIS — M25.50: ICD-10-CM

## 2020-11-12 DIAGNOSIS — Z91.010: ICD-10-CM

## 2020-11-12 DIAGNOSIS — Z79.4: ICD-10-CM

## 2020-11-12 DIAGNOSIS — Z88.6: ICD-10-CM

## 2020-11-12 DIAGNOSIS — U07.1: Primary | ICD-10-CM

## 2020-11-12 LAB
ANION GAP SERPL CALC-SCNC: 16.2 MEQ/L (ref 7–13)
CHLORIDE SERPL-SCNC: 89 MMOL/L (ref 98–107)
SODIUM SERPL-SCNC: 127 MMOL/L (ref 136–145)

## 2020-11-12 PROCEDURE — U0002 COVID-19 LAB TEST NON-CDC: HCPCS

## 2020-11-12 NOTE — EDM.PDOC
ED HPI GENERAL MEDICAL PROBLEM





- General


Chief Complaint: General


Stated Complaint: MIGRAINE, NAUSEA, DIZZINESS


Time Seen by Provider: 11/12/20 19:00





- History of Present Illness


INITIAL COMMENTS - FREE TEXT/NARRATIVE: 





ED ambulatory with c/o not feeling well past 3-4 days COVID tested at  Clinic 

Tuesday but no results yet. No fever, some chills. Generalized headache.  Hx RA 

and increased general discomfort. Tramadol at home but doesn't like to take, 

Tylenol 2 days ago none since. drinking lots of water but not much for appetite,

No vomiting.  few loose stools. No loss of taste or smell. No sore throat or 

cough. No shortness of breath. 


  ** Generalized


Pain Score (Numeric/FACES): 8





- Related Data


                                    Allergies











Allergy/AdvReac Type Severity Reaction Status Date / Time


 


celecoxib [From Celebrex] Allergy Severe Blisters Verified 11/12/20 18:37


 


peanut Allergy Severe Blisters Verified 11/12/20 18:37











Home Meds: 


                                    Home Meds





diphenhydrAMINE HCl/Zinc Acet [Benadryl Itch Stopping Crm] 1 applic TOP Q6HR PRN

04/27/16 [History]


diphenhydrAMINE [Benadryl] 50 mg PO Q4HR PRN 04/27/16 [History]


traMADol [Ultram] 50 mg PO Q6HR PRN 04/27/16 [History]


metFORMIN HCl [Metformin HCl] 1,000 mg PO BID 04/28/16 [History]


Cyanocobalamin (Vitamin B-12) [B-12] 1,000 mcg PO DAILY 04/20/18 [History]


DULoxetine [Cymbalta] 20 mg PO DAILY 04/20/18 [History]


Multivitamin/Iron/Folic Acid [Centrum Adults Tablet] 1 tab PO DAILY 04/20/18 

[History]


Omega-3/DHA/Epa/Fish Oil [Fish Oil 1,000 mg Softgel] 1,000 mg PO DAILY 04/20/18 

[History]


Tumeric 1 tab PO DAILY 04/20/18 [History]


Dicyclomine [Bentyl] 20 mg PO Q6H PRN  cap 04/21/18 [Rx]


Famotidine [Pepcid] 20 mg PO BID  tablet 04/21/18 [Rx]


HYDROmorphone [Dilaudid] 1 mg IVPUSH Q4H PRN  syringe 04/21/18 [Rx]


Heparin Sodium 5,000 units SUBCUT Q8HR  vial 04/21/18 [Rx]


Insulin Aspart [NovoLOG] 0 unit SUBCUT TIDAC  pen 04/21/18 [Rx]


Ondansetron [Zofran ODT] 4 mg PO Q6H PRN  tab.dis 04/21/18 [Rx]


Pantoprazole [ProTONIX IV***] 40 mg IVPUSH BIDAC  vial 04/21/18 [Rx]


Sucralfate [Carafate] 1 gm PO TIDAC  cup 04/21/18 [Rx]


Zolpidem [Ambien] 5 mg PO BEDTIME PRN  tablet 04/21/18 [Rx]


oxyCODONE 5 mg PO Q4H PRN  tablet 04/21/18 [Rx]











Past Medical History


HEENT History: Reports: None


Cardiovascular History: Reports: None


Respiratory History: Reports: None


Gastrointestinal History: Reports: None


Other Gastrointestinal History: ??ulcer


Genitourinary History: Reports: None


OB/GYN History: Reports: None


Musculoskeletal History: Reports: RA


Neurological History: Reports: Migraines


Psychiatric History: Reports: None


Endocrine/Metabolic History: Reports: Diabetes, Type II, Other (See Below)


Other Endocrine/Metabolic History: borderline diabetes


Hematologic History: Reports: None


Immunologic History: Reports: Other (See Below)


Other Immunologic History: RA


Oncologic (Cancer) History: Reports: None


Dermatologic History: Reports: None





- Infectious Disease History


Infectious Disease History: Reports: None





- Past Surgical History


Head Surgeries/Procedures: Reports: None


Female  Surgical History: Reports: Hysterectomy


Musculoskeletal Surgical History: Reports: Knee Replacement





Social & Family History





- Family History


Family Medical History: No Pertinent Family History





- Caffeine Use


Caffeine Use: Reports: Coffee





- Recreational Drug Use


Recreational Drug Use: No





ED ROS GENERAL





- Review of Systems


Review Of Systems: Comprehensive ROS is negative, except as noted in HPI.





ED EXAM, GENERAL





- Physical Exam


Exam: See Below


Exam Limited By: No Limitations


General Appearance: Alert, Anxious, Mild Distress


Eye Exam: Bilateral Eye: EOMI


Ears: Normal External Exam


Nose: Normal Inspection


Throat/Mouth: Normal Inspection


Head: Atraumatic, Normocephalic


Neck: Normal Inspection, Full Range of Motion


Respiratory/Chest: No Respiratory Distress, Lungs Clear, Normal Breath Sounds


Cardiovascular: Normal Peripheral Pulses, Regular Rate, Rhythm


GI/Abdominal: Normal Bowel Sounds, Soft


Back Exam: Normal Inspection, Full Range of Motion


Extremities: Normal Inspection, No Pedal Edema


Neurological: Alert, Oriented, CN II-XII Intact, Normal Cognition


Psychiatric: Anxious


Skin Exam: Warm, Dry, Intact, Normal Color





Course





- Vital Signs


Last Recorded V/S: 


                                Last Vital Signs











Temp  96.5 F L  11/12/20 18:37


 


Pulse  106 H  11/12/20 18:37


 


Resp  18   11/12/20 18:37


 


BP  143/81 H  11/12/20 18:37


 


Pulse Ox  95   11/12/20 18:37














- Orders/Labs/Meds


Orders: 


                               Active Orders 24 hr











 Category Date Time Status


 


 Isolation [COMM] Routine Oth  11/12/20 19:11 Active











Labs: 


                                Laboratory Tests











  11/12/20 11/12/20 11/12/20 Range/Units





  19:20 19:20 19:20 


 


WBC  7.6    (5.0-10.0)  10^3/uL


 


RBC  5.30    (4.2-5.4)  10^6/uL


 


Hgb  15.2  D    (12.0-16.0)  g/dL


 


Hct  44.0    (37.0-47.0)  %


 


MCV  83.0  D    ()  fL


 


MCH  28.7    (27.0-34.0)  pg


 


MCHC  34.5    (33.0-35.0)  g/dL


 


Plt Count  266    (150-450)  10^3/uL


 


Neut % (Auto)  72.4    (42.2-75.2)  %


 


Lymph % (Auto)  18.9 L    (20.5-50.1)  %


 


Mono % (Auto)  8.2 H    (2-8)  %


 


Eos % (Auto)  0.4 L    (1.0-3.0)  %


 


Baso % (Auto)  0.1    (0.0-1.0)  %


 


D-Dimer, Quantitative     (0-400)  ng/mL


 


Sodium   127 L   (136-145)  mmol/L


 


Potassium   5.2 H   (3.5-5.1)  mmol/L


 


Chloride   89 L   ()  mmol/L


 


Carbon Dioxide   27   (21-32)  mmol/L


 


Anion Gap   16.2 H   (7-13)  mEq/L


 


BUN   28 H   (7-18)  mg/dL


 


Creatinine   0.95   (0.55-1.02)  mg/dL


 


Est Cr Clr Drug Dosing   TNP   


 


Estimated GFR (MDRD)   60   


 


BUN/Creatinine Ratio   29.5   (No establ ref range)  


 


Glucose   311 H   (74-99)  mg/dL


 


POC Glucose     ()  mg/dl


 


Lactic Acid    1.4  (0.4-2.0)  mmol/L


 


Calcium   9.9   (8.5-10.1)  mg/dL


 


Magnesium   2.0   (1.8-2.4)  mg/dL


 


Total Bilirubin   0.7   (0.2-1.0)  mg/dL


 


AST   14 L   (15-37)  U/L


 


ALT   33   (14-59)  U/L


 


Alkaline Phosphatase   119 H   ()  U/L


 


C-Reactive Protein   2.8 H   (0.0-0.9)  mg/dL


 


Total Protein   8.3 H   (6.4-8.2)  g/dL


 


Albumin   3.4   (3.4-5.0)  g/dL


 


Globulin   4.9   


 


Albumin/Globulin Ratio   0.7   


 


Amylase   40   ()  U/L


 


Lipase   56 L   ()  U/L


 


Urine Color     (YELLOW)  


 


Urine Appearance     (CLEAR)  


 


Urine pH     (5.0-9.0)  


 


Ur Specific Gravity     (1.005-1.030)  


 


Urine Protein     (NEGATIVE)  


 


Urine Glucose (UA)     (NEGATIVE)  


 


Urine Ketones     (NEGATIVE)  


 


Urine Occult Blood     (NEGATIVE)  


 


Urine Nitrite     (NEGATIVE)  


 


Urine Bilirubin     (NEGATIVE)  


 


Urine Urobilinogen     (0.2-1.0)  mg/dL


 


Ur Leukocyte Esterase     (NEGATIVE)  


 


SARS CoV-2 RNA Rapid ALMA     (NEGATIVE)  














  11/12/20 11/12/20 11/12/20 Range/Units





  19:20 19:20 19:50 


 


WBC     (5.0-10.0)  10^3/uL


 


RBC     (4.2-5.4)  10^6/uL


 


Hgb     (12.0-16.0)  g/dL


 


Hct     (37.0-47.0)  %


 


MCV     ()  fL


 


MCH     (27.0-34.0)  pg


 


MCHC     (33.0-35.0)  g/dL


 


Plt Count     (150-450)  10^3/uL


 


Neut % (Auto)     (42.2-75.2)  %


 


Lymph % (Auto)     (20.5-50.1)  %


 


Mono % (Auto)     (2-8)  %


 


Eos % (Auto)     (1.0-3.0)  %


 


Baso % (Auto)     (0.0-1.0)  %


 


D-Dimer, Quantitative  162    (0-400)  ng/mL


 


Sodium     (136-145)  mmol/L


 


Potassium     (3.5-5.1)  mmol/L


 


Chloride     ()  mmol/L


 


Carbon Dioxide     (21-32)  mmol/L


 


Anion Gap     (7-13)  mEq/L


 


BUN     (7-18)  mg/dL


 


Creatinine     (0.55-1.02)  mg/dL


 


Est Cr Clr Drug Dosing     


 


Estimated GFR (MDRD)     


 


BUN/Creatinine Ratio     (No establ ref range)  


 


Glucose     (74-99)  mg/dL


 


POC Glucose     ()  mg/dl


 


Lactic Acid     (0.4-2.0)  mmol/L


 


Calcium     (8.5-10.1)  mg/dL


 


Magnesium     (1.8-2.4)  mg/dL


 


Total Bilirubin     (0.2-1.0)  mg/dL


 


AST     (15-37)  U/L


 


ALT     (14-59)  U/L


 


Alkaline Phosphatase     ()  U/L


 


C-Reactive Protein     (0.0-0.9)  mg/dL


 


Total Protein     (6.4-8.2)  g/dL


 


Albumin     (3.4-5.0)  g/dL


 


Globulin     


 


Albumin/Globulin Ratio     


 


Amylase     ()  U/L


 


Lipase     ()  U/L


 


Urine Color    Yellow  (YELLOW)  


 


Urine Appearance    Clear  (CLEAR)  


 


Urine pH    5.5  (5.0-9.0)  


 


Ur Specific Gravity    1.015  (1.005-1.030)  


 


Urine Protein    Negative  (NEGATIVE)  


 


Urine Glucose (UA)    500 H  (NEGATIVE)  


 


Urine Ketones    40 H  (NEGATIVE)  


 


Urine Occult Blood    Negative  (NEGATIVE)  


 


Urine Nitrite    Negative  (NEGATIVE)  


 


Urine Bilirubin    Negative  (NEGATIVE)  


 


Urine Urobilinogen    0.2  (0.2-1.0)  mg/dL


 


Ur Leukocyte Esterase    Negative  (NEGATIVE)  


 


SARS CoV-2 RNA Rapid ALMA   Positive H   (NEGATIVE)  














  11/12/20 Range/Units





  21:56 


 


WBC   (5.0-10.0)  10^3/uL


 


RBC   (4.2-5.4)  10^6/uL


 


Hgb   (12.0-16.0)  g/dL


 


Hct   (37.0-47.0)  %


 


MCV   ()  fL


 


MCH   (27.0-34.0)  pg


 


MCHC   (33.0-35.0)  g/dL


 


Plt Count   (150-450)  10^3/uL


 


Neut % (Auto)   (42.2-75.2)  %


 


Lymph % (Auto)   (20.5-50.1)  %


 


Mono % (Auto)   (2-8)  %


 


Eos % (Auto)   (1.0-3.0)  %


 


Baso % (Auto)   (0.0-1.0)  %


 


D-Dimer, Quantitative   (0-400)  ng/mL


 


Sodium   (136-145)  mmol/L


 


Potassium   (3.5-5.1)  mmol/L


 


Chloride   ()  mmol/L


 


Carbon Dioxide   (21-32)  mmol/L


 


Anion Gap   (7-13)  mEq/L


 


BUN   (7-18)  mg/dL


 


Creatinine   (0.55-1.02)  mg/dL


 


Est Cr Clr Drug Dosing   


 


Estimated GFR (MDRD)   


 


BUN/Creatinine Ratio   (No establ ref range)  


 


Glucose   (74-99)  mg/dL


 


POC Glucose  208 H  ()  mg/dl


 


Lactic Acid   (0.4-2.0)  mmol/L


 


Calcium   (8.5-10.1)  mg/dL


 


Magnesium   (1.8-2.4)  mg/dL


 


Total Bilirubin   (0.2-1.0)  mg/dL


 


AST   (15-37)  U/L


 


ALT   (14-59)  U/L


 


Alkaline Phosphatase   ()  U/L


 


C-Reactive Protein   (0.0-0.9)  mg/dL


 


Total Protein   (6.4-8.2)  g/dL


 


Albumin   (3.4-5.0)  g/dL


 


Globulin   


 


Albumin/Globulin Ratio   


 


Amylase   ()  U/L


 


Lipase   ()  U/L


 


Urine Color   (YELLOW)  


 


Urine Appearance   (CLEAR)  


 


Urine pH   (5.0-9.0)  


 


Ur Specific Gravity   (1.005-1.030)  


 


Urine Protein   (NEGATIVE)  


 


Urine Glucose (UA)   (NEGATIVE)  


 


Urine Ketones   (NEGATIVE)  


 


Urine Occult Blood   (NEGATIVE)  


 


Urine Nitrite   (NEGATIVE)  


 


Urine Bilirubin   (NEGATIVE)  


 


Urine Urobilinogen   (0.2-1.0)  mg/dL


 


Ur Leukocyte Esterase   (NEGATIVE)  


 


SARS CoV-2 RNA Rapid ALMA   (NEGATIVE)  











Meds: 


Medications














Discontinued Medications














Generic Name Dose Route Start Last Admin





  Trade Name Howard  PRN Reason Stop Dose Admin


 


Diphenhydramine HCl  25 mg  11/12/20 19:33  11/12/20 20:06





  Benadryl  IVPUSH  11/12/20 19:34  25 mg





  ONETIME ONE   Administration


 


Fentanyl  50 mcg  11/12/20 19:35  11/12/20 20:08





  Sublimaze  IVPUSH  11/12/20 19:36  50 mcg





  ONETIME ONE   Administration


 


Fentanyl  50 mcg  11/12/20 21:16  11/12/20 21:21





  Sublimaze  IVPUSH  11/12/20 21:17  50 mcg





  ONETIME ONE   Administration


 


Sodium Chloride  1,000 mls @ 999 mls/hr  11/12/20 19:33  11/12/20 19:55





  Normal Saline  IV  11/12/20 20:33  999 mls/hr





  .BOLUS ONE   Administration


 


Insulin Human Regular  3 unit  11/12/20 20:44  11/12/20 21:12





  Humulin R  IV  11/12/20 20:45  3 units





  ONETIME ONE   Administration


 


Ondansetron HCl  4 mg  11/12/20 19:33  11/12/20 20:04





  Zofran  IVPUSH  11/12/20 19:34  4 mg





  ONETIME ONE   Administration














Departure





- Departure


Time of Disposition: 21:45


Disposition: Home, Self-Care 01


Condition: Good


Clinical Impression: 


 COVID-19, Hyperglycemia





Arthralgia


Qualifiers:


 Joint pain location: unspecified Qualified Code(s): M25.50 - Pain in 

unspecified joint








- Discharge Information


*PRESCRIPTION DRUG MONITORING PROGRAM REVIEWED*: No


*COPY OF PRESCRIPTION DRUG MONITORING REPORT IN PATIENT DANE: No


Instructions:  COVID-19 Frequently Asked Questions, Musculoskeletal Pain


Forms:  ED Department Discharge


Additional Instructions: 


alternate tylenol with tramadol


encourage fluids


isolation


monitor blood sugars


follow up if severe difficulty breathing or unable to tolerate liquids with 

repeated vomiting


over counter  products like bengay/ icy hot to ease discomfort


 notify PCP of positive results


limit exposure to others





Sepsis Event Note (ED)





- Evaluation


Sepsis Screening Result: No Definite Risk





- Focused Exam


Vital Signs: 


                                   Vital Signs











  Temp Pulse Resp BP Pulse Ox


 


 11/12/20 18:37  96.5 F L  106 H  18  143/81 H  95














- My Orders


Last 24 Hours: 


My Active Orders





11/12/20 19:11


Isolation [COMM] Routine 














- Assessment/Plan


Last 24 Hours: 


My Active Orders





11/12/20 19:11


Isolation [COMM] Routine

## 2021-12-11 ENCOUNTER — HOSPITAL ENCOUNTER (EMERGENCY)
Dept: HOSPITAL 43 - DL.ED | Age: 61
Discharge: HOME | End: 2021-12-11
Payer: MEDICARE

## 2021-12-11 VITALS — SYSTOLIC BLOOD PRESSURE: 145 MMHG | HEART RATE: 103 BPM | DIASTOLIC BLOOD PRESSURE: 106 MMHG

## 2021-12-11 DIAGNOSIS — E11.9: ICD-10-CM

## 2021-12-11 DIAGNOSIS — Z20.822: ICD-10-CM

## 2021-12-11 DIAGNOSIS — K59.00: Primary | ICD-10-CM

## 2021-12-11 DIAGNOSIS — Z79.4: ICD-10-CM

## 2021-12-11 DIAGNOSIS — Z88.8: ICD-10-CM

## 2021-12-11 DIAGNOSIS — Z79.899: ICD-10-CM

## 2021-12-11 DIAGNOSIS — Z91.010: ICD-10-CM

## 2021-12-11 LAB
AMPHET UR QL SCN: NEGATIVE
AMPHET UR QL SCN: NEGATIVE
AMPHETAMINES UR QL SCN>500 NG/ML: NEGATIVE
ANION GAP SERPL CALC-SCNC: 17.5 MEQ/L (ref 7–13)
APAP SERPL-SCNC: 0 UG/ML
BARBITURATES UR QL SCN: NEGATIVE
CHLORIDE SERPL-SCNC: 96 MMOL/L (ref 98–107)
MDMA UR QL SCN: NEGATIVE
OXYCODONE UR QL SCN: NEGATIVE
PCP UR QL SCN: NEGATIVE
SARS-COV-2 RNA RESP QL NAA+PROBE: NEGATIVE
SODIUM SERPL-SCNC: 135 MMOL/L (ref 136–145)
SP GR UR STRIP: 1.01 (ref 1–1.03)
TRICYCLICS UR QL SCN: POSITIVE

## 2021-12-11 PROCEDURE — 80053 COMPREHEN METABOLIC PANEL: CPT

## 2021-12-11 PROCEDURE — 83690 ASSAY OF LIPASE: CPT

## 2021-12-11 PROCEDURE — C9113 INJ PANTOPRAZOLE SODIUM, VIA: HCPCS

## 2021-12-11 PROCEDURE — 83735 ASSAY OF MAGNESIUM: CPT

## 2021-12-11 PROCEDURE — 85025 COMPLETE CBC W/AUTO DIFF WBC: CPT

## 2021-12-11 PROCEDURE — 80143 DRUG ASSAY ACETAMINOPHEN: CPT

## 2021-12-11 PROCEDURE — 80307 DRUG TEST PRSMV CHEM ANLYZR: CPT

## 2021-12-11 PROCEDURE — 82150 ASSAY OF AMYLASE: CPT

## 2021-12-11 PROCEDURE — 83605 ASSAY OF LACTIC ACID: CPT

## 2021-12-11 PROCEDURE — 99284 EMERGENCY DEPT VISIT MOD MDM: CPT

## 2021-12-11 PROCEDURE — 96375 TX/PRO/DX INJ NEW DRUG ADDON: CPT

## 2021-12-11 PROCEDURE — 80305 DRUG TEST PRSMV DIR OPT OBS: CPT

## 2021-12-11 PROCEDURE — 82947 ASSAY GLUCOSE BLOOD QUANT: CPT

## 2021-12-11 PROCEDURE — 0240U: CPT

## 2021-12-11 PROCEDURE — 96374 THER/PROPH/DIAG INJ IV PUSH: CPT

## 2021-12-11 PROCEDURE — 80179 DRUG ASSAY SALICYLATE: CPT

## 2021-12-11 PROCEDURE — 36415 COLL VENOUS BLD VENIPUNCTURE: CPT

## 2021-12-11 PROCEDURE — 81001 URINALYSIS AUTO W/SCOPE: CPT

## 2021-12-11 PROCEDURE — 74177 CT ABD & PELVIS W/CONTRAST: CPT

## 2021-12-11 PROCEDURE — 82140 ASSAY OF AMMONIA: CPT

## 2021-12-11 NOTE — EDM.PDOC
Scribed by Carmelina Pandey 12/11/21 [id number] for Shyam Carey PA





ED HPI GENERAL MEDICAL PROBLEM





- General


Chief Complaint: Abdominal Pain


Stated Complaint: DIABETES / SAID SHE FEELS LIKE SHE IS DYING


Time Seen by Provider: 12/11/21 13:05


Source of Information: Reports: Patient, RN, RN Notes Reviewed


History Limitations: Reports: No Limitations





- History of Present Illness


INITIAL COMMENTS - FREE TEXT/NARRATIVE: 


Patient is a 61-year-old female who reports to ED with abdominal pain that 

started 45 minutes ago. The patient reports she has not taken anything for her 

pain. The patient denies any similar symptoms in the past. Patient denies any 

abdominal surgeries. The patient reports she had 1 episode of diarrhea today 

with some nausea. The patient reports she did take Tramadol this morning for her

RA (which is in remission). 


Onset: Today


Duration: Constant


Location: Reports: Abdomen


Quality: Reports: Ache


Severity: Severe


Improves with: Reports: None


Worsens with: Reports: None


Associated Symptoms: Reports: No Other Symptoms





- Related Data


                                    Allergies











Allergy/AdvReac Type Severity Reaction Status Date / Time


 


celecoxib [From Celebrex] Allergy Severe Blisters Verified 12/11/21 13:04


 


peanut Allergy Severe Blisters Verified 12/11/21 13:04











Home Meds: 


                                    Home Meds





diphenhydrAMINE HCl/Zinc Acet [Benadryl Itch Stopping Crm] 1 applic TOP Q6HR PRN

04/27/16 [History]


diphenhydrAMINE [Benadryl] 50 mg PO Q4HR PRN 04/27/16 [History]


traMADol [Ultram] 50 mg PO Q6HR PRN 04/27/16 [History]


metFORMIN HCl [Metformin HCl] 1,000 mg PO BID 04/28/16 [History]


Cyanocobalamin (Vitamin B-12) [B-12] 1,000 mcg PO DAILY 04/20/18 [History]


DULoxetine [Cymbalta] 20 mg PO DAILY 04/20/18 [History]


Multivitamin/Iron/Folic Acid [Centrum Adults Tablet] 1 tab PO DAILY 04/20/18 

[History]


Omega-3/DHA/Epa/Fish Oil [Fish Oil 1,000 mg Softgel] 1,000 mg PO DAILY 04/20/18 

[History]


Tumeric 1 tab PO DAILY 04/20/18 [History]


Dicyclomine [Bentyl] 20 mg PO Q6H PRN  cap 04/21/18 [Rx]


Famotidine [Pepcid] 20 mg PO BID  tablet 04/21/18 [Rx]


HYDROmorphone [Dilaudid] 1 mg IVPUSH Q4H PRN  syringe 04/21/18 [Rx]


Heparin Sodium 5,000 units SUBCUT Q8HR  vial 04/21/18 [Rx]


Insulin Aspart [NovoLOG] 0 unit SUBCUT TIDAC  pen 04/21/18 [Rx]


Ondansetron [Zofran ODT] 4 mg PO Q6H PRN  tab.dis 04/21/18 [Rx]


Pantoprazole [ProTONIX IV***] 40 mg IVPUSH BIDAC  vial 04/21/18 [Rx]


Sucralfate [Carafate] 1 gm PO TIDAC  cup 04/21/18 [Rx]


Zolpidem [Ambien] 5 mg PO BEDTIME PRN  tablet 04/21/18 [Rx]


oxyCODONE 5 mg PO Q4H PRN  tablet 04/21/18 [Rx]











Past Medical History


HEENT History: Reports: None


Cardiovascular History: Reports: None


Respiratory History: Reports: None


Gastrointestinal History: Reports: None


Other Gastrointestinal History: ??ulcer


Genitourinary History: Reports: None


OB/GYN History: Reports: None


Musculoskeletal History: Reports: RA


Neurological History: Reports: Migraines


Psychiatric History: Reports: None


Endocrine/Metabolic History: Reports: Diabetes, Type II, Other (See Below)


Other Endocrine/Metabolic History: borderline diabetes


Hematologic History: Reports: None


Immunologic History: Reports: Other (See Below)


Other Immunologic History: RA


Oncologic (Cancer) History: Reports: None


Dermatologic History: Reports: None





- Infectious Disease History


Infectious Disease History: Reports: None





- Past Surgical History


Head Surgeries/Procedures: Reports: None


Female  Surgical History: Reports: Hysterectomy


Musculoskeletal Surgical History: Reports: Knee Replacement





Social & Family History





- Family History


Family Medical History: No Pertinent Family History





- Caffeine Use


Caffeine Use: Reports: Coffee





ED ROS GENERAL





- Review of Systems


Review Of Systems: Comprehensive ROS is negative, except as noted in HPI.





ED EXAM, GI/ABD





- Physical Exam


Exam: See Below


Exam Limited By: No Limitations


General Appearance: Alert, WD/WN, Moderate Distress, Other (patient was yelling 

and screaming throughout evaluatiobn)


Eyes: Bilateral: Normal Appearance


Ears: Normal External Exam, Normal Canal, Hearing Grossly Normal, Normal TMs


Nose: Normal Inspection, Normal Mucosa, No Blood


Throat/Mouth: Normal Inspection


Head: Atraumatic, Normocephalic


Neck: Normal Inspection, Supple, Non-Tender, Full Range of Motion


Respiratory/Chest: No Respiratory Distress, Lungs Clear, Normal Breath Sounds, 

No Accessory Muscle Use, Chest Non-Tender


Cardiovascular: Normal Peripheral Pulses, Regular Rate, Rhythm, No Edema, No 

Gallop, No JVD, No Murmur, No Rub


GI/Abdominal Exam: Other (diffuse abdominal paibn)


 (Female) Exam: Deferred


Rectal (Female) Exam: Deferred


Back Exam: Normal Inspection, Full Range of Motion, NT


Extremities: Normal Inspection, Normal Range of Motion, Non-Tender, Normal 

Capillary Refill, No Pedal Edema


Neurological: Alert, Oriented, CN II-XII Intact, Normal Cognition, Normal Gait, 

Normal Reflexes, No Motor/Sensory Deficits


Psychiatric: Normal Affect, Normal Mood


Skin Exam: Warm, Dry, Intact, Normal Color, No Rash


Lymphatic: No Adenopathy





Course





- Vital Signs


Last Recorded V/S: 


                                Last Vital Signs











Temp  96 F L  12/11/21 14:27


 


Pulse  103 H  12/11/21 14:27


 


Resp  18   12/11/21 14:27


 


BP  145/106 H  12/11/21 14:27


 


Pulse Ox  96   12/11/21 14:27














- Orders/Labs/Meds


Orders: 


                               Active Orders 24 hr











 Category Date Time Status


 


 AMMONIA VENOUS [CHEM] Stat Lab  12/11/21 13:00 Received


 


 CULTURE BLOOD [BC] Stat Lab  12/11/21 12:56 Ordered


 


 REFLEX LACTIC ACID YES OR NO [CHEM] Routine Lab  12/11/21 13:47 Received











Labs: 


                                Laboratory Tests











  12/11/21 12/11/21 12/11/21 Range/Units





  12:53 13:00 13:00 


 


WBC   10.3 H   (5.0-10.0)  10^3/uL


 


RBC   5.19   (4.2-5.4)  10^6/uL


 


Hgb   14.5   (12.0-16.0)  g/dL


 


Hct   43.8   (37.0-47.0)  %


 


MCV   84.4   ()  fL


 


MCH   27.9   (27.0-34.0)  pg


 


MCHC   33.1   (33.0-35.0)  g/dL


 


Plt Count   411  D   (150-450)  10^3/uL


 


Neut % (Auto)   59.4   (42.2-75.2)  %


 


Lymph % (Auto)   29.6   (20.5-50.1)  %


 


Mono % (Auto)   9.1 H   (2-8)  %


 


Eos % (Auto)   1.6   (1.0-3.0)  %


 


Baso % (Auto)   0.3   (0.0-1.0)  %


 


Sodium    135 L  (136-145)  mmol/L


 


Potassium    3.5  D  (3.5-5.1)  mmol/L


 


Chloride    96 L  ()  mmol/L


 


Carbon Dioxide    25  (21-32)  mmol/L


 


Anion Gap    17.5 H  (7-13)  mEq/L


 


BUN    15  (7-18)  mg/dL


 


Creatinine    0.92  (0.55-1.02)  mg/dL


 


Est Cr Clr Drug Dosing    63.96  mL/min


 


Estimated GFR (MDRD)    > 60  


 


BUN/Creatinine Ratio    16.3  (No establ ref range)  


 


Glucose    391 H  (70-99)  mg/dL


 


POC Glucose     (70-99)  mg/dL


 


Lactic Acid     (0.4-2.0)  mmol/L


 


Calcium    8.6  (8.5-10.1)  mg/dL


 


Magnesium    1.8  (1.8-2.4)  mg/dL


 


Total Bilirubin    0.5  (0.2-1.0)  mg/dL


 


AST    14 L  (15-37)  U/L


 


ALT    22  (14-59)  U/L


 


Alkaline Phosphatase    119 H  ()  U/L


 


Total Protein    7.3  (6.4-8.2)  g/dL


 


Albumin    3.1 L  (3.4-5.0)  g/dL


 


Globulin    4.2  


 


Albumin/Globulin Ratio    0.74  


 


Amylase    40  ()  U/L


 


Lipase    54 L  ()  U/L


 


Urine Color     (YELLOW)  


 


Urine Appearance     (CLEAR)  


 


Urine pH     (5.0-9.0)  


 


Ur Specific Gravity     (1.005-1.030)  


 


Urine Protein     (NEGATIVE)  


 


Urine Glucose (UA)     (NEGATIVE)  


 


Urine Ketones     (NEGATIVE)  


 


Urine Occult Blood     (NEGATIVE)  


 


Urine Nitrite     (NEGATIVE)  


 


Urine Bilirubin     (NEGATIVE)  


 


Urine Urobilinogen     (0.2-1.0)  mg/dL


 


Ur Leukocyte Esterase     (NEGATIVE)  


 


Urine RBC     (0-5)  /HPF


 


Urine WBC     (0-5/HPF)  /HPF


 


Ur Epithelial Cells     (NOT SEEN)  /HPF


 


Urine Bacteria     (0-FEW/HPF)  /HPF


 


Salicylates     (2.8-20(Therapeutic))  mg/dL


 


Urine Opiates Screen     (NEGATIVE)  


 


Ur Oxycodone Screen     (NEGATIVE)  


 


Urine Methadone Screen     (NEGATIVE)  


 


Acetaminophen    0 L  (10-30 (Therapeutic))  ug/mL


 


Ur Barbiturates Screen     (NEGATIVE)  


 


U Tricyclic Antidepress     (NEGATIVE)  


 


Ur Phencyclidine Scrn     (NEGATIVE)  


 


Ur Amphetamine Screen     (NEGATIVE)  


 


U Methamphetamines Scrn     (NEGATIVE)  


 


Urine MDMA Screen     (NEGATIVE)  


 


U Benzodiazepines Scrn     (NEGATIVE)  


 


Urine Cocaine Screen     (NEGATIVE)  


 


U Marijuana (THC) Screen     (NEGATIVE)  


 


Ethyl Alcohol    < 3  (0)  mg/dL


 


Influenza Type A RNA  Negative    (NEGATIVE)  


 


Influenza Type B RNA  Negative    (NEGATIVE)  


 


SARS-CoV-2 RNA (ALMA)  Negative    (NEGATIVE)  














  12/11/21 12/11/21 12/11/21 Range/Units





  13:00 13:00 13:00 


 


WBC     (5.0-10.0)  10^3/uL


 


RBC     (4.2-5.4)  10^6/uL


 


Hgb     (12.0-16.0)  g/dL


 


Hct     (37.0-47.0)  %


 


MCV     ()  fL


 


MCH     (27.0-34.0)  pg


 


MCHC     (33.0-35.0)  g/dL


 


Plt Count     (150-450)  10^3/uL


 


Neut % (Auto)     (42.2-75.2)  %


 


Lymph % (Auto)     (20.5-50.1)  %


 


Mono % (Auto)     (2-8)  %


 


Eos % (Auto)     (1.0-3.0)  %


 


Baso % (Auto)     (0.0-1.0)  %


 


Sodium     (136-145)  mmol/L


 


Potassium     (3.5-5.1)  mmol/L


 


Chloride     ()  mmol/L


 


Carbon Dioxide     (21-32)  mmol/L


 


Anion Gap     (7-13)  mEq/L


 


BUN     (7-18)  mg/dL


 


Creatinine     (0.55-1.02)  mg/dL


 


Est Cr Clr Drug Dosing     mL/min


 


Estimated GFR (MDRD)     


 


BUN/Creatinine Ratio     (No establ ref range)  


 


Glucose     (70-99)  mg/dL


 


POC Glucose    367 H  (70-99)  mg/dL


 


Lactic Acid  3.8 H*    (0.4-2.0)  mmol/L


 


Calcium     (8.5-10.1)  mg/dL


 


Magnesium     (1.8-2.4)  mg/dL


 


Total Bilirubin     (0.2-1.0)  mg/dL


 


AST     (15-37)  U/L


 


ALT     (14-59)  U/L


 


Alkaline Phosphatase     ()  U/L


 


Total Protein     (6.4-8.2)  g/dL


 


Albumin     (3.4-5.0)  g/dL


 


Globulin     


 


Albumin/Globulin Ratio     


 


Amylase     ()  U/L


 


Lipase     ()  U/L


 


Urine Color     (YELLOW)  


 


Urine Appearance     (CLEAR)  


 


Urine pH     (5.0-9.0)  


 


Ur Specific Gravity     (1.005-1.030)  


 


Urine Protein     (NEGATIVE)  


 


Urine Glucose (UA)     (NEGATIVE)  


 


Urine Ketones     (NEGATIVE)  


 


Urine Occult Blood     (NEGATIVE)  


 


Urine Nitrite     (NEGATIVE)  


 


Urine Bilirubin     (NEGATIVE)  


 


Urine Urobilinogen     (0.2-1.0)  mg/dL


 


Ur Leukocyte Esterase     (NEGATIVE)  


 


Urine RBC     (0-5)  /HPF


 


Urine WBC     (0-5/HPF)  /HPF


 


Ur Epithelial Cells     (NOT SEEN)  /HPF


 


Urine Bacteria     (0-FEW/HPF)  /HPF


 


Salicylates   < 2.8 L   (2.8-20(Therapeutic))  mg/dL


 


Urine Opiates Screen     (NEGATIVE)  


 


Ur Oxycodone Screen     (NEGATIVE)  


 


Urine Methadone Screen     (NEGATIVE)  


 


Acetaminophen     (10-30 (Therapeutic))  ug/mL


 


Ur Barbiturates Screen     (NEGATIVE)  


 


U Tricyclic Antidepress     (NEGATIVE)  


 


Ur Phencyclidine Scrn     (NEGATIVE)  


 


Ur Amphetamine Screen     (NEGATIVE)  


 


U Methamphetamines Scrn     (NEGATIVE)  


 


Urine MDMA Screen     (NEGATIVE)  


 


U Benzodiazepines Scrn     (NEGATIVE)  


 


Urine Cocaine Screen     (NEGATIVE)  


 


U Marijuana (THC) Screen     (NEGATIVE)  


 


Ethyl Alcohol     (0)  mg/dL


 


Influenza Type A RNA     (NEGATIVE)  


 


Influenza Type B RNA     (NEGATIVE)  


 


SARS-CoV-2 RNA (ALMA)     (NEGATIVE)  














  12/11/21 12/11/21 Range/Units





  13:34 13:34 


 


WBC    (5.0-10.0)  10^3/uL


 


RBC    (4.2-5.4)  10^6/uL


 


Hgb    (12.0-16.0)  g/dL


 


Hct    (37.0-47.0)  %


 


MCV    ()  fL


 


MCH    (27.0-34.0)  pg


 


MCHC    (33.0-35.0)  g/dL


 


Plt Count    (150-450)  10^3/uL


 


Neut % (Auto)    (42.2-75.2)  %


 


Lymph % (Auto)    (20.5-50.1)  %


 


Mono % (Auto)    (2-8)  %


 


Eos % (Auto)    (1.0-3.0)  %


 


Baso % (Auto)    (0.0-1.0)  %


 


Sodium    (136-145)  mmol/L


 


Potassium    (3.5-5.1)  mmol/L


 


Chloride    ()  mmol/L


 


Carbon Dioxide    (21-32)  mmol/L


 


Anion Gap    (7-13)  mEq/L


 


BUN    (7-18)  mg/dL


 


Creatinine    (0.55-1.02)  mg/dL


 


Est Cr Clr Drug Dosing    mL/min


 


Estimated GFR (MDRD)    


 


BUN/Creatinine Ratio    (No establ ref range)  


 


Glucose    (70-99)  mg/dL


 


POC Glucose    (70-99)  mg/dL


 


Lactic Acid    (0.4-2.0)  mmol/L


 


Calcium    (8.5-10.1)  mg/dL


 


Magnesium    (1.8-2.4)  mg/dL


 


Total Bilirubin    (0.2-1.0)  mg/dL


 


AST    (15-37)  U/L


 


ALT    (14-59)  U/L


 


Alkaline Phosphatase    ()  U/L


 


Total Protein    (6.4-8.2)  g/dL


 


Albumin    (3.4-5.0)  g/dL


 


Globulin    


 


Albumin/Globulin Ratio    


 


Amylase    ()  U/L


 


Lipase    ()  U/L


 


Urine Color  Yellow   (YELLOW)  


 


Urine Appearance  Clear   (CLEAR)  


 


Urine pH  6.5   (5.0-9.0)  


 


Ur Specific Gravity  1.015   (1.005-1.030)  


 


Urine Protein  Negative   (NEGATIVE)  


 


Urine Glucose (UA)  >=1000 H   (NEGATIVE)  


 


Urine Ketones  Trace H   (NEGATIVE)  


 


Urine Occult Blood  Trace-intact H   (NEGATIVE)  


 


Urine Nitrite  Negative   (NEGATIVE)  


 


Urine Bilirubin  Negative   (NEGATIVE)  


 


Urine Urobilinogen  0.2   (0.2-1.0)  mg/dL


 


Ur Leukocyte Esterase  Negative   (NEGATIVE)  


 


Urine RBC  0-5   (0-5)  /HPF


 


Urine WBC  0-5   (0-5/HPF)  /HPF


 


Ur Epithelial Cells  Moderate H   (NOT SEEN)  /HPF


 


Urine Bacteria  Few   (0-FEW/HPF)  /HPF


 


Salicylates    (2.8-20(Therapeutic))  mg/dL


 


Urine Opiates Screen   Negative  (NEGATIVE)  


 


Ur Oxycodone Screen   Negative  (NEGATIVE)  


 


Urine Methadone Screen   Negative  (NEGATIVE)  


 


Acetaminophen    (10-30 (Therapeutic))  ug/mL


 


Ur Barbiturates Screen   Negative  (NEGATIVE)  


 


U Tricyclic Antidepress   Positive H  (NEGATIVE)  


 


Ur Phencyclidine Scrn   Negative  (NEGATIVE)  


 


Ur Amphetamine Screen   Negative  (NEGATIVE)  


 


U Methamphetamines Scrn   Negative  (NEGATIVE)  


 


Urine MDMA Screen   Negative  (NEGATIVE)  


 


U Benzodiazepines Scrn   Negative  (NEGATIVE)  


 


Urine Cocaine Screen   Negative  (NEGATIVE)  


 


U Marijuana (THC) Screen   Negative  (NEGATIVE)  


 


Ethyl Alcohol    (0)  mg/dL


 


Influenza Type A RNA    (NEGATIVE)  


 


Influenza Type B RNA    (NEGATIVE)  


 


SARS-CoV-2 RNA (ALMA)    (NEGATIVE)  











Meds: 


Medications














Discontinued Medications














Generic Name Dose Route Start Last Admin





  Trade Name Adrianoq  PRN Reason Stop Dose Admin


 


Al Hydroxide/Mg Hydroxide  30 ml  12/11/21 14:52 





  Gi Cocktail Oral Solution 30 Ml  PO  12/11/21 14:53 





  ONETIME ONE  


 


Hydromorphone HCl  1 mg  12/11/21 13:54  12/11/21 14:22





  Hydromorphone 1 Mg/Ml Syringe  IVPUSH  12/11/21 13:55  1 mg





  ONETIME ONE   Administration


 


Iopamidol  100 ml  12/11/21 13:54  12/11/21 14:06





  Iopamidol 612 Mg/Ml 100 Ml Bottle  IVPUSH  12/11/21 13:55  100 ml





  ONETIME ONE   Administration


 


Ondansetron HCl  4 mg  12/11/21 13:45  12/11/21 13:52





  Ondansetron 4 Mg/2 Ml Sdv  IVPUSH  12/11/21 13:46  4 mg





  ONETIME ONE   Administration


 


Pantoprazole Sodium  40 mg  12/11/21 13:46  12/11/21 13:52





  Pantoprazole 40 Mg Vial  IVPUSH  12/11/21 13:47  40 mg





  ONETIME ONE   Administration














- Re-Assessments/Exams


Free Text/Narrative Re-Assessment/Exam: 





12/11/21 13:46


The patient had a small bloody emesis (< 50 mL). 





Departure





- Departure


Time of Disposition: 14:55


Disposition: Home, Self-Care 01


Condition: Fair


Clinical Impression: 


Abdominal pain


Qualifiers:


 Abdominal location: generalized Qualified Code(s): R10.84 - Generalized 

abdominal pain





Constipation


Qualifiers:


 Constipation type: unspecified constipation type Qualified Code(s): K59.00 - 

Constipation, unspecified








- Discharge Information


*PRESCRIPTION DRUG MONITORING PROGRAM REVIEWED*: Not Applicable


*COPY OF PRESCRIPTION DRUG MONITORING REPORT IN PATIENT DANE: Not Applicable


Instructions:  Abdominal Pain, Adult, Easy-to-Read, Constipation, Adult, 

Easy-to-Read


Forms:  ED Department Discharge


Care Plan Goals: 


The patient was advised of the examination, lab and CT results during the visit.

 The patient was given IV Zofran (for nausea), IV Protonix (for stomach acid),  

IV Dilaudid (for pain) and an oral GI Cocktail. The patient was encouraged to t

sandra a stool softener for relief of the constipation. If the patient has any 

additional symptoms or concerns, the patient should either return to the 

emergency department or visit her primary care facility. 





Sepsis Event Note (ED)





- Evaluation


Sepsis Screening Result: No Definite Risk





- Focused Exam


Vital Signs: 


                                   Vital Signs











  Temp Pulse Resp BP Pulse Ox


 


 12/11/21 14:27  96 F L  103 H  18  145/106 H  96


 


 12/11/21 13:02  96.9 F  105 H  18  158/104 H  96














- My Orders


Last 24 Hours: 


My Active Orders





12/11/21 12:56


CULTURE BLOOD [BC] Stat 





12/11/21 13:00


AMMONIA VENOUS [CHEM] Stat 





12/11/21 13:47


REFLEX LACTIC ACID YES OR NO [CHEM] Routine 














- Assessment/Plan


Last 24 Hours: 


My Active Orders





12/11/21 12:56


CULTURE BLOOD [BC] Stat 





12/11/21 13:00


AMMONIA VENOUS [CHEM] Stat 





12/11/21 13:47


REFLEX LACTIC ACID YES OR NO [CHEM] Routine 














I have read and agree with the documentation that has been completed regarding 

this visit. By signing this record, I attest that the documentation was 

completed in my physical presence and is an accurate record of the encounter.

## 2021-12-11 NOTE — CT
PROCEDURE INFORMATION: 

Exam: CT Abdomen And Pelvis With Contrast 

Exam date and time: 12/11/2021 2:02 PM 

Age: 61 years old 

Clinical indication: Abdominal pain; Localized; Upper; Additional info: Upper 

abdominal pain (wbc - 10.3) 



TECHNIQUE: 

Imaging protocol: Computed tomography of the abdomen and pelvis with contrast. 

Radiation optimization: All CT scans at this facility use at least one of these 

dose optimization techniques: automated exposure control; mA and/or kV 

adjustment per patient size (includes targeted exams where dose is matched to 

clinical indication); or iterative reconstruction. 

Contrast material: ISOVUE 300; Contrast volume: 100 ml; Contrast route: 

INTRAVENOUS (IV);  



COMPARISON: 

CT Abdomen Pelvis w Cont 4/20/2018 8:29 AM 



FINDINGS: 

Lungs: There is minor atelectasis and/or scarring in the lung bases. There are 

multiple 4-5 mm pulmonary nodules in the left lower lobe (series 2, images 6, 

11 and 22). These are unchanged compared with the previous study. There are no 

pleural effusions. 



Liver: The liver is homogeneous in appearance without focal hepatic lesions. 

Gallbladder and bile ducts: The gallbladder is not distended. There is no 

biliary ductal dilatation. 

Pancreas: The pancreas is within normal limits. 

Spleen: The spleen is normal in size. 

Adrenal glands: The adrenal glands are normal in appearance. 

Kidneys and ureters: The kidneys are symmetric in size. There is no evidence of 

hydronephrosis, renal stone or mass. There is a 7 mm benign-appearing left 

renal cyst, stable. 

Stomach and bowel: The stomach is not distended. No pathologically dilated 

small bowel loops are identified. There is no evidence of colonic wall 

thickening or pericolonic inflammation. 

Appendix: There is a normal appendix in the right lower quadrant. 



Intraperitoneal space: There is no free air or free fluid in the abdomen or 

pelvis. 

Vasculature: The abdominal aorta is normal in caliber. The celiac axis, SMA and 

MICHELLE are patent. 

Lymph nodes: No pathologically enlarged lymph nodes are identified in the 

abdomen or pelvis. 

Urinary bladder: The urinary bladder appears normal. 

Reproductive: The uterus is surgically absent. No adnexal masses are 

identified. 

Bones/joints: There is anterolisthesis of L4 upon L5, a stable finding. No 

acute fractures or aggressive bone lesions are identified. 

Soft tissues: The soft tissues are within normal limits. 



IMPRESSION: 

1. Stable CT appearance of the abdomen and pelvis. There is no evidence of 

bowel obstruction, perforation or acute inflammation. 

2. Left lower lobe pulmonary nodules are stable dating back to the previous 

exam from April 2018 and can be considered benign. 



COMMENTS: 

Consistent with the [employer] College [employer]'s Incidental Findings 

Committee white paper (J Am Joselyn Radiol 2018): Any incidental renal lesion less 

than 1 cm or classified as too small to characterize, or any incidental cystic 

renal lesion characterized as simple-appearing, is likely benign. No follow-up 

imaging is recommended for these lesions per consensus recommendations based on 

imaging criteria.

## 2022-07-23 ENCOUNTER — HOSPITAL ENCOUNTER (EMERGENCY)
Dept: HOSPITAL 43 - DL.ED | Age: 62
Discharge: HOME | End: 2022-07-23
Payer: MEDICARE

## 2022-07-23 VITALS — SYSTOLIC BLOOD PRESSURE: 165 MMHG | DIASTOLIC BLOOD PRESSURE: 93 MMHG | HEART RATE: 109 BPM

## 2022-07-23 DIAGNOSIS — Z88.1: ICD-10-CM

## 2022-07-23 DIAGNOSIS — S91.011A: ICD-10-CM

## 2022-07-23 DIAGNOSIS — E11.9: ICD-10-CM

## 2022-07-23 DIAGNOSIS — S09.90XA: ICD-10-CM
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
## 2022-07-23 DIAGNOSIS — Z79.4: ICD-10-CM

## 2022-07-23 DIAGNOSIS — Z79.84: ICD-10-CM

## 2022-07-23 DIAGNOSIS — E04.1: ICD-10-CM

## 2022-07-23 DIAGNOSIS — S82.831A: ICD-10-CM

## 2022-07-23 DIAGNOSIS — Z23: ICD-10-CM

## 2022-07-23 DIAGNOSIS — Z91.010: ICD-10-CM

## 2022-07-23 DIAGNOSIS — Z86.16: ICD-10-CM

## 2022-07-23 DIAGNOSIS — Z79.899: ICD-10-CM

## 2022-07-23 DIAGNOSIS — S82.841A: Primary | ICD-10-CM

## 2022-07-23 PROCEDURE — 12005 RPR S/N/A/GEN/TRK12.6-20.0CM: CPT

## 2022-07-23 PROCEDURE — 90715 TDAP VACCINE 7 YRS/> IM: CPT

## 2022-07-23 PROCEDURE — 70450 CT HEAD/BRAIN W/O DYE: CPT

## 2022-07-23 PROCEDURE — 90471 IMMUNIZATION ADMIN: CPT

## 2022-07-23 PROCEDURE — 99283 EMERGENCY DEPT VISIT LOW MDM: CPT

## 2022-07-23 PROCEDURE — 96375 TX/PRO/DX INJ NEW DRUG ADDON: CPT

## 2022-07-23 PROCEDURE — 96376 TX/PRO/DX INJ SAME DRUG ADON: CPT

## 2022-07-23 PROCEDURE — 96365 THER/PROPH/DIAG IV INF INIT: CPT

## 2022-07-23 PROCEDURE — 72125 CT NECK SPINE W/O DYE: CPT

## 2022-07-23 PROCEDURE — 73610 X-RAY EXAM OF ANKLE: CPT

## 2025-06-05 ENCOUNTER — HOSPITAL ENCOUNTER (EMERGENCY)
Dept: HOSPITAL 43 - DL.ED | Age: 65
Discharge: HOME | End: 2025-06-05
Payer: MEDICARE

## 2025-06-05 VITALS — HEART RATE: 110 BPM | DIASTOLIC BLOOD PRESSURE: 89 MMHG | SYSTOLIC BLOOD PRESSURE: 147 MMHG
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
## 2025-06-05 DIAGNOSIS — Z88.8: ICD-10-CM

## 2025-06-05 DIAGNOSIS — Z86.16: ICD-10-CM

## 2025-06-05 DIAGNOSIS — Z79.84: ICD-10-CM

## 2025-06-05 DIAGNOSIS — E11.9: ICD-10-CM

## 2025-06-05 DIAGNOSIS — Z91.010: ICD-10-CM

## 2025-06-05 DIAGNOSIS — S29.9XXA: Primary | ICD-10-CM

## 2025-06-05 DIAGNOSIS — Z79.899: ICD-10-CM

## 2025-06-05 DIAGNOSIS — Z79.4: ICD-10-CM
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
## 2025-06-05 PROCEDURE — 96372 THER/PROPH/DIAG INJ SC/IM: CPT

## 2025-06-05 PROCEDURE — 99283 EMERGENCY DEPT VISIT LOW MDM: CPT

## 2025-06-05 RX ADMIN — LIDOCAINE ONE MG: 50 PATCH CUTANEOUS at 18:12

## 2025-06-05 RX ADMIN — KETOROLAC TROMETHAMINE ONE MG: 30 INJECTION, SOLUTION INTRAMUSCULAR at 18:03
